# Patient Record
Sex: FEMALE | Race: WHITE | Employment: FULL TIME | URBAN - METROPOLITAN AREA
[De-identification: names, ages, dates, MRNs, and addresses within clinical notes are randomized per-mention and may not be internally consistent; named-entity substitution may affect disease eponyms.]

---

## 2019-01-20 ENCOUNTER — HOSPITAL ENCOUNTER (EMERGENCY)
Age: 54
Discharge: HOME | End: 2019-01-20
Attending: EMERGENCY MEDICINE
Payer: COMMERCIAL

## 2019-01-20 ENCOUNTER — APPOINTMENT (OUTPATIENT)
Dept: GENERAL RADIOLOGY | Age: 54
End: 2019-01-20
Payer: COMMERCIAL

## 2019-01-20 VITALS
HEART RATE: 88 BPM | WEIGHT: 212 LBS | TEMPERATURE: 98.1 F | RESPIRATION RATE: 18 BRPM | DIASTOLIC BLOOD PRESSURE: 67 MMHG | HEIGHT: 67 IN | SYSTOLIC BLOOD PRESSURE: 146 MMHG | BODY MASS INDEX: 33.27 KG/M2 | OXYGEN SATURATION: 98 %

## 2019-01-20 DIAGNOSIS — M54.42 ACUTE MIDLINE LOW BACK PAIN WITH LEFT-SIDED SCIATICA: Primary | ICD-10-CM

## 2019-01-20 LAB
BILIRUBIN URINE: NEGATIVE
BLOOD, URINE: NEGATIVE
CLARITY: CLEAR
COLOR: YELLOW
GLUCOSE URINE: NEGATIVE MG/DL
KETONES, URINE: NEGATIVE MG/DL
LEUKOCYTE ESTERASE, URINE: NEGATIVE
NITRITE, URINE: NEGATIVE
PH UA: 5.5 (ref 5–9)
PROTEIN UA: NEGATIVE MG/DL
SPECIFIC GRAVITY UA: 1.01 (ref 1–1.03)
UROBILINOGEN, URINE: 0.2 E.U./DL

## 2019-01-20 PROCEDURE — 96372 THER/PROPH/DIAG INJ SC/IM: CPT

## 2019-01-20 PROCEDURE — 99283 EMERGENCY DEPT VISIT LOW MDM: CPT

## 2019-01-20 PROCEDURE — 81003 URINALYSIS AUTO W/O SCOPE: CPT

## 2019-01-20 PROCEDURE — 6360000002 HC RX W HCPCS: Performed by: EMERGENCY MEDICINE

## 2019-01-20 PROCEDURE — 72100 X-RAY EXAM L-S SPINE 2/3 VWS: CPT

## 2019-01-20 PROCEDURE — 96374 THER/PROPH/DIAG INJ IV PUSH: CPT

## 2019-01-20 RX ORDER — PREDNISONE 50 MG/1
TABLET ORAL
Qty: 5 TABLET | Refills: 0 | Status: SHIPPED | OUTPATIENT
Start: 2019-01-20 | End: 2019-02-01 | Stop reason: ALTCHOICE

## 2019-01-20 RX ORDER — ORPHENADRINE CITRATE 30 MG/ML
60 INJECTION INTRAMUSCULAR; INTRAVENOUS ONCE
Status: COMPLETED | OUTPATIENT
Start: 2019-01-20 | End: 2019-01-20

## 2019-01-20 RX ORDER — NAPROXEN 250 MG/1
250 TABLET ORAL 2 TIMES DAILY WITH MEALS
Qty: 30 TABLET | Refills: 0 | Status: SHIPPED | OUTPATIENT
Start: 2019-01-20 | End: 2020-05-13 | Stop reason: ALTCHOICE

## 2019-01-20 RX ORDER — KETOROLAC TROMETHAMINE 30 MG/ML
30 INJECTION, SOLUTION INTRAMUSCULAR; INTRAVENOUS ONCE
Status: COMPLETED | OUTPATIENT
Start: 2019-01-20 | End: 2019-01-20

## 2019-01-20 RX ORDER — DEXAMETHASONE SODIUM PHOSPHATE 10 MG/ML
10 INJECTION INTRAMUSCULAR; INTRAVENOUS ONCE
Status: COMPLETED | OUTPATIENT
Start: 2019-01-20 | End: 2019-01-20

## 2019-01-20 RX ORDER — ORPHENADRINE CITRATE 100 MG/1
100 TABLET, EXTENDED RELEASE ORAL 2 TIMES DAILY
Qty: 20 TABLET | Refills: 0 | Status: SHIPPED | OUTPATIENT
Start: 2019-01-20 | End: 2019-01-30

## 2019-01-20 RX ADMIN — KETOROLAC TROMETHAMINE 30 MG: 30 INJECTION, SOLUTION INTRAMUSCULAR; INTRAVENOUS at 15:57

## 2019-01-20 RX ADMIN — ORPHENADRINE CITRATE 60 MG: 60 INJECTION INTRAMUSCULAR; INTRAVENOUS at 15:57

## 2019-01-20 RX ADMIN — DEXAMETHASONE SODIUM PHOSPHATE 10 MG: 10 INJECTION INTRAMUSCULAR; INTRAVENOUS at 15:57

## 2019-01-20 ASSESSMENT — PAIN SCALES - GENERAL
PAINLEVEL_OUTOF10: 8
PAINLEVEL_OUTOF10: 5
PAINLEVEL_OUTOF10: 6

## 2019-01-30 RX ORDER — SODIUM CHLORIDE, SODIUM LACTATE, POTASSIUM CHLORIDE, CALCIUM CHLORIDE 600; 310; 30; 20 MG/100ML; MG/100ML; MG/100ML; MG/100ML
INJECTION, SOLUTION INTRAVENOUS CONTINUOUS
Status: CANCELLED | OUTPATIENT
Start: 2019-01-30

## 2019-01-30 RX ORDER — SODIUM CHLORIDE 0.9 % (FLUSH) 0.9 %
10 SYRINGE (ML) INJECTION EVERY 12 HOURS SCHEDULED
Status: CANCELLED | OUTPATIENT
Start: 2019-01-30

## 2019-01-30 RX ORDER — SODIUM CHLORIDE 0.9 % (FLUSH) 0.9 %
10 SYRINGE (ML) INJECTION PRN
Status: CANCELLED | OUTPATIENT
Start: 2019-01-30

## 2019-02-01 ENCOUNTER — HOSPITAL ENCOUNTER (OUTPATIENT)
Dept: PREADMISSION TESTING | Age: 54
Discharge: HOME | End: 2019-02-01
Payer: COMMERCIAL

## 2019-02-01 VITALS
RESPIRATION RATE: 16 BRPM | HEART RATE: 75 BPM | WEIGHT: 207.19 LBS | TEMPERATURE: 98.1 F | SYSTOLIC BLOOD PRESSURE: 124 MMHG | HEIGHT: 67 IN | BODY MASS INDEX: 32.52 KG/M2 | DIASTOLIC BLOOD PRESSURE: 70 MMHG | OXYGEN SATURATION: 99 %

## 2019-02-01 DIAGNOSIS — Z01.818 PRE-OP TESTING: Primary | ICD-10-CM

## 2019-02-01 LAB
ABO/RH: NORMAL
ALBUMIN SERPL-MCNC: 4.4 G/DL (ref 3.5–5.2)
ALP BLD-CCNC: 75 U/L (ref 35–104)
ALT SERPL-CCNC: 24 U/L (ref 0–32)
ANION GAP SERPL CALCULATED.3IONS-SCNC: 8 MMOL/L (ref 7–16)
ANTIBODY SCREEN: NORMAL
AST SERPL-CCNC: 15 U/L (ref 0–31)
BACTERIA: ABNORMAL /HPF
BILIRUB SERPL-MCNC: 0.3 MG/DL (ref 0–1.2)
BILIRUBIN URINE: NEGATIVE
BLOOD, URINE: NEGATIVE
BUN BLDV-MCNC: 14 MG/DL (ref 6–20)
CALCIUM SERPL-MCNC: 9.3 MG/DL (ref 8.6–10.2)
CHLORIDE BLD-SCNC: 107 MMOL/L (ref 98–107)
CLARITY: ABNORMAL
CO2: 27 MMOL/L (ref 22–29)
COLOR: YELLOW
CREAT SERPL-MCNC: 0.7 MG/DL (ref 0.5–1)
CRYSTALS, UA: ABNORMAL
EKG ATRIAL RATE: 74 BPM
EKG P AXIS: 42 DEGREES
EKG P-R INTERVAL: 156 MS
EKG Q-T INTERVAL: 380 MS
EKG QRS DURATION: 78 MS
EKG QTC CALCULATION (BAZETT): 421 MS
EKG R AXIS: 11 DEGREES
EKG T AXIS: 44 DEGREES
EKG VENTRICULAR RATE: 74 BPM
EPITHELIAL CELLS, UA: ABNORMAL /HPF
GFR AFRICAN AMERICAN: >60
GFR NON-AFRICAN AMERICAN: >60 ML/MIN/1.73
GLUCOSE BLD-MCNC: 134 MG/DL (ref 74–99)
GLUCOSE URINE: NEGATIVE MG/DL
HCT VFR BLD CALC: 40.9 % (ref 34–48)
HEMOGLOBIN: 13.5 G/DL (ref 11.5–15.5)
KETONES, URINE: ABNORMAL MG/DL
LEUKOCYTE ESTERASE, URINE: NEGATIVE
MCH RBC QN AUTO: 29.7 PG (ref 26–35)
MCHC RBC AUTO-ENTMCNC: 33 % (ref 32–34.5)
MCV RBC AUTO: 89.9 FL (ref 80–99.9)
NITRITE, URINE: NEGATIVE
PDW BLD-RTO: 12.4 FL (ref 11.5–15)
PH UA: 5 (ref 5–9)
PLATELET # BLD: 278 E9/L (ref 130–450)
PMV BLD AUTO: 9.3 FL (ref 7–12)
POTASSIUM REFLEX MAGNESIUM: 4.3 MMOL/L (ref 3.5–5)
PROTEIN UA: NEGATIVE MG/DL
RBC # BLD: 4.55 E12/L (ref 3.5–5.5)
RBC UA: ABNORMAL /HPF (ref 0–2)
SODIUM BLD-SCNC: 142 MMOL/L (ref 132–146)
SPECIFIC GRAVITY UA: >=1.03 (ref 1–1.03)
TOTAL PROTEIN: 7 G/DL (ref 6.4–8.3)
UROBILINOGEN, URINE: 0.2 E.U./DL
WBC # BLD: 6.3 E9/L (ref 4.5–11.5)
WBC UA: ABNORMAL /HPF (ref 0–5)

## 2019-02-01 PROCEDURE — 93005 ELECTROCARDIOGRAM TRACING: CPT | Performed by: OBSTETRICS & GYNECOLOGY

## 2019-02-01 PROCEDURE — 80053 COMPREHEN METABOLIC PANEL: CPT

## 2019-02-01 PROCEDURE — 36415 COLL VENOUS BLD VENIPUNCTURE: CPT

## 2019-02-01 PROCEDURE — 85027 COMPLETE CBC AUTOMATED: CPT

## 2019-02-01 PROCEDURE — 86900 BLOOD TYPING SEROLOGIC ABO: CPT

## 2019-02-01 PROCEDURE — 81001 URINALYSIS AUTO W/SCOPE: CPT

## 2019-02-01 PROCEDURE — 86850 RBC ANTIBODY SCREEN: CPT

## 2019-02-01 PROCEDURE — 86901 BLOOD TYPING SEROLOGIC RH(D): CPT

## 2019-02-01 RX ORDER — ORPHENADRINE CITRATE 100 MG/1
100 TABLET, EXTENDED RELEASE ORAL 2 TIMES DAILY
COMMUNITY
End: 2020-05-13 | Stop reason: ALTCHOICE

## 2019-02-01 RX ORDER — ZOLPIDEM TARTRATE 5 MG/1
5 TABLET ORAL NIGHTLY PRN
COMMUNITY
End: 2020-05-13 | Stop reason: ALTCHOICE

## 2019-02-01 RX ORDER — M-VIT,TX,IRON,MINS/CALC/FOLIC 27MG-0.4MG
1 TABLET ORAL DAILY
COMMUNITY

## 2019-02-01 RX ORDER — CALCIUM CARBONATE 500(1250)
500 TABLET ORAL DAILY
COMMUNITY
End: 2019-10-07 | Stop reason: ALTCHOICE

## 2019-02-01 ASSESSMENT — PAIN DESCRIPTION - LOCATION: LOCATION: FOOT

## 2019-02-01 ASSESSMENT — PAIN DESCRIPTION - ORIENTATION: ORIENTATION: RIGHT;LEFT

## 2019-02-01 ASSESSMENT — PAIN SCALES - GENERAL: PAINLEVEL_OUTOF10: 8

## 2019-02-01 ASSESSMENT — PAIN DESCRIPTION - FREQUENCY: FREQUENCY: CONTINUOUS

## 2019-02-01 ASSESSMENT — PAIN DESCRIPTION - DESCRIPTORS: DESCRIPTORS: SHARP;SHOOTING

## 2019-02-01 ASSESSMENT — PAIN DESCRIPTION - PAIN TYPE: TYPE: CHRONIC PAIN

## 2019-02-14 ENCOUNTER — ANESTHESIA (OUTPATIENT)
Dept: OPERATING ROOM | Age: 54
End: 2019-02-14
Payer: COMMERCIAL

## 2019-02-14 ENCOUNTER — HOSPITAL ENCOUNTER (OUTPATIENT)
Age: 54
LOS: 1 days | Discharge: HOME | End: 2019-02-15
Attending: OBSTETRICS & GYNECOLOGY | Admitting: OBSTETRICS & GYNECOLOGY
Payer: COMMERCIAL

## 2019-02-14 ENCOUNTER — ANESTHESIA EVENT (OUTPATIENT)
Dept: OPERATING ROOM | Age: 54
End: 2019-02-14
Payer: COMMERCIAL

## 2019-02-14 VITALS
RESPIRATION RATE: 1 BRPM | TEMPERATURE: 95.7 F | OXYGEN SATURATION: 100 % | SYSTOLIC BLOOD PRESSURE: 103 MMHG | DIASTOLIC BLOOD PRESSURE: 85 MMHG

## 2019-02-14 PROBLEM — N87.9 DYSPLASIA OF CERVIX: Status: ACTIVE | Noted: 2019-02-14

## 2019-02-14 PROCEDURE — 2500000003 HC RX 250 WO HCPCS: Performed by: REGISTERED NURSE

## 2019-02-14 PROCEDURE — 7100000000 HC PACU RECOVERY - FIRST 15 MIN: Performed by: OBSTETRICS & GYNECOLOGY

## 2019-02-14 PROCEDURE — 6360000002 HC RX W HCPCS: Performed by: OBSTETRICS & GYNECOLOGY

## 2019-02-14 PROCEDURE — 6360000002 HC RX W HCPCS: Performed by: REGISTERED NURSE

## 2019-02-14 PROCEDURE — 3600000009 HC SURGERY ROBOT BASE: Performed by: OBSTETRICS & GYNECOLOGY

## 2019-02-14 PROCEDURE — 2580000003 HC RX 258: Performed by: OBSTETRICS & GYNECOLOGY

## 2019-02-14 PROCEDURE — 2720000010 HC SURG SUPPLY STERILE: Performed by: OBSTETRICS & GYNECOLOGY

## 2019-02-14 PROCEDURE — 3700000000 HC ANESTHESIA ATTENDED CARE: Performed by: OBSTETRICS & GYNECOLOGY

## 2019-02-14 PROCEDURE — 3700000001 HC ADD 15 MINUTES (ANESTHESIA): Performed by: OBSTETRICS & GYNECOLOGY

## 2019-02-14 PROCEDURE — 6370000000 HC RX 637 (ALT 250 FOR IP): Performed by: OBSTETRICS & GYNECOLOGY

## 2019-02-14 PROCEDURE — 7100000001 HC PACU RECOVERY - ADDTL 15 MIN: Performed by: OBSTETRICS & GYNECOLOGY

## 2019-02-14 PROCEDURE — S2900 ROBOTIC SURGICAL SYSTEM: HCPCS | Performed by: OBSTETRICS & GYNECOLOGY

## 2019-02-14 PROCEDURE — 3600000019 HC SURGERY ROBOT ADDTL 15MIN: Performed by: OBSTETRICS & GYNECOLOGY

## 2019-02-14 PROCEDURE — 2709999900 HC NON-CHARGEABLE SUPPLY: Performed by: OBSTETRICS & GYNECOLOGY

## 2019-02-14 PROCEDURE — 88307 TISSUE EXAM BY PATHOLOGIST: CPT

## 2019-02-14 RX ORDER — PROPOFOL 10 MG/ML
INJECTION, EMULSION INTRAVENOUS PRN
Status: DISCONTINUED | OUTPATIENT
Start: 2019-02-14 | End: 2019-02-14 | Stop reason: SDUPTHER

## 2019-02-14 RX ORDER — PROMETHAZINE HYDROCHLORIDE 25 MG/ML
INJECTION, SOLUTION INTRAMUSCULAR; INTRAVENOUS PRN
Status: DISCONTINUED | OUTPATIENT
Start: 2019-02-14 | End: 2019-02-14 | Stop reason: SDUPTHER

## 2019-02-14 RX ORDER — NEOSTIGMINE METHYLSULFATE 1 MG/ML
INJECTION, SOLUTION INTRAVENOUS PRN
Status: DISCONTINUED | OUTPATIENT
Start: 2019-02-14 | End: 2019-02-14 | Stop reason: SDUPTHER

## 2019-02-14 RX ORDER — HYDROCODONE BITARTRATE AND ACETAMINOPHEN 5; 325 MG/1; MG/1
2 TABLET ORAL EVERY 6 HOURS PRN
Status: DISCONTINUED | OUTPATIENT
Start: 2019-02-14 | End: 2019-02-15 | Stop reason: HOSPADM

## 2019-02-14 RX ORDER — FENTANYL CITRATE 50 UG/ML
INJECTION, SOLUTION INTRAMUSCULAR; INTRAVENOUS PRN
Status: DISCONTINUED | OUTPATIENT
Start: 2019-02-14 | End: 2019-02-14 | Stop reason: SDUPTHER

## 2019-02-14 RX ORDER — FENTANYL CITRATE 50 UG/ML
50 INJECTION, SOLUTION INTRAMUSCULAR; INTRAVENOUS EVERY 5 MIN PRN
Status: DISCONTINUED | OUTPATIENT
Start: 2019-02-14 | End: 2019-02-14 | Stop reason: HOSPADM

## 2019-02-14 RX ORDER — MORPHINE SULFATE 2 MG/ML
2 INJECTION, SOLUTION INTRAMUSCULAR; INTRAVENOUS
Status: DISCONTINUED | OUTPATIENT
Start: 2019-02-14 | End: 2019-02-15 | Stop reason: HOSPADM

## 2019-02-14 RX ORDER — SODIUM CHLORIDE, SODIUM LACTATE, POTASSIUM CHLORIDE, CALCIUM CHLORIDE 600; 310; 30; 20 MG/100ML; MG/100ML; MG/100ML; MG/100ML
INJECTION, SOLUTION INTRAVENOUS CONTINUOUS
Status: DISCONTINUED | OUTPATIENT
Start: 2019-02-14 | End: 2019-02-15 | Stop reason: HOSPADM

## 2019-02-14 RX ORDER — MEPERIDINE HYDROCHLORIDE 25 MG/ML
12.5 INJECTION INTRAMUSCULAR; INTRAVENOUS; SUBCUTANEOUS EVERY 5 MIN PRN
Status: DISCONTINUED | OUTPATIENT
Start: 2019-02-14 | End: 2019-02-14 | Stop reason: HOSPADM

## 2019-02-14 RX ORDER — MEPERIDINE HYDROCHLORIDE 50 MG/ML
INJECTION INTRAMUSCULAR; INTRAVENOUS; SUBCUTANEOUS PRN
Status: DISCONTINUED | OUTPATIENT
Start: 2019-02-14 | End: 2019-02-14 | Stop reason: SDUPTHER

## 2019-02-14 RX ORDER — HYDROCODONE BITARTRATE AND ACETAMINOPHEN 5; 325 MG/1; MG/1
1 TABLET ORAL EVERY 6 HOURS PRN
Status: DISCONTINUED | OUTPATIENT
Start: 2019-02-14 | End: 2019-02-15 | Stop reason: HOSPADM

## 2019-02-14 RX ORDER — SODIUM CHLORIDE 0.9 % (FLUSH) 0.9 %
10 SYRINGE (ML) INJECTION EVERY 12 HOURS SCHEDULED
Status: DISCONTINUED | OUTPATIENT
Start: 2019-02-14 | End: 2019-02-14 | Stop reason: HOSPADM

## 2019-02-14 RX ORDER — OXYCODONE HYDROCHLORIDE AND ACETAMINOPHEN 5; 325 MG/1; MG/1
1 TABLET ORAL EVERY 4 HOURS PRN
Status: DISCONTINUED | OUTPATIENT
Start: 2019-02-14 | End: 2019-02-14

## 2019-02-14 RX ORDER — FENTANYL CITRATE 50 UG/ML
25 INJECTION, SOLUTION INTRAMUSCULAR; INTRAVENOUS EVERY 5 MIN PRN
Status: DISCONTINUED | OUTPATIENT
Start: 2019-02-14 | End: 2019-02-14 | Stop reason: HOSPADM

## 2019-02-14 RX ORDER — ONDANSETRON 2 MG/ML
4 INJECTION INTRAMUSCULAR; INTRAVENOUS
Status: DISCONTINUED | OUTPATIENT
Start: 2019-02-14 | End: 2019-02-14 | Stop reason: HOSPADM

## 2019-02-14 RX ORDER — SODIUM CHLORIDE 0.9 % (FLUSH) 0.9 %
10 SYRINGE (ML) INJECTION PRN
Status: DISCONTINUED | OUTPATIENT
Start: 2019-02-14 | End: 2019-02-14 | Stop reason: HOSPADM

## 2019-02-14 RX ORDER — GLYCOPYRROLATE 1 MG/5 ML
SYRINGE (ML) INTRAVENOUS PRN
Status: DISCONTINUED | OUTPATIENT
Start: 2019-02-14 | End: 2019-02-14 | Stop reason: SDUPTHER

## 2019-02-14 RX ORDER — DOCUSATE SODIUM 100 MG/1
100 CAPSULE, LIQUID FILLED ORAL 2 TIMES DAILY
Status: DISCONTINUED | OUTPATIENT
Start: 2019-02-14 | End: 2019-02-15 | Stop reason: HOSPADM

## 2019-02-14 RX ORDER — SODIUM CHLORIDE 0.9 % (FLUSH) 0.9 %
10 SYRINGE (ML) INJECTION PRN
Status: DISCONTINUED | OUTPATIENT
Start: 2019-02-14 | End: 2019-02-15 | Stop reason: HOSPADM

## 2019-02-14 RX ORDER — KETOROLAC TROMETHAMINE 30 MG/ML
INJECTION, SOLUTION INTRAMUSCULAR; INTRAVENOUS PRN
Status: DISCONTINUED | OUTPATIENT
Start: 2019-02-14 | End: 2019-02-14 | Stop reason: SDUPTHER

## 2019-02-14 RX ORDER — MIDAZOLAM HYDROCHLORIDE 1 MG/ML
INJECTION INTRAMUSCULAR; INTRAVENOUS PRN
Status: DISCONTINUED | OUTPATIENT
Start: 2019-02-14 | End: 2019-02-14 | Stop reason: SDUPTHER

## 2019-02-14 RX ORDER — DEXAMETHASONE SODIUM PHOSPHATE 10 MG/ML
INJECTION, SOLUTION INTRAMUSCULAR; INTRAVENOUS PRN
Status: DISCONTINUED | OUTPATIENT
Start: 2019-02-14 | End: 2019-02-14 | Stop reason: SDUPTHER

## 2019-02-14 RX ORDER — ROCURONIUM BROMIDE 10 MG/ML
INJECTION, SOLUTION INTRAVENOUS PRN
Status: DISCONTINUED | OUTPATIENT
Start: 2019-02-14 | End: 2019-02-14 | Stop reason: SDUPTHER

## 2019-02-14 RX ORDER — CEFAZOLIN SODIUM 2 G/50ML
2 SOLUTION INTRAVENOUS
Status: COMPLETED | OUTPATIENT
Start: 2019-02-14 | End: 2019-02-14

## 2019-02-14 RX ORDER — SODIUM CHLORIDE 0.9 % (FLUSH) 0.9 %
10 SYRINGE (ML) INJECTION EVERY 12 HOURS SCHEDULED
Status: DISCONTINUED | OUTPATIENT
Start: 2019-02-14 | End: 2019-02-15 | Stop reason: HOSPADM

## 2019-02-14 RX ORDER — OXYCODONE HYDROCHLORIDE AND ACETAMINOPHEN 5; 325 MG/1; MG/1
2 TABLET ORAL EVERY 4 HOURS PRN
Status: DISCONTINUED | OUTPATIENT
Start: 2019-02-14 | End: 2019-02-14

## 2019-02-14 RX ORDER — LIDOCAINE HYDROCHLORIDE 20 MG/ML
INJECTION, SOLUTION EPIDURAL; INFILTRATION; INTRACAUDAL; PERINEURAL PRN
Status: DISCONTINUED | OUTPATIENT
Start: 2019-02-14 | End: 2019-02-14 | Stop reason: SDUPTHER

## 2019-02-14 RX ORDER — IBUPROFEN 800 MG/1
800 TABLET ORAL EVERY 6 HOURS PRN
Status: DISCONTINUED | OUTPATIENT
Start: 2019-02-14 | End: 2019-02-15 | Stop reason: HOSPADM

## 2019-02-14 RX ORDER — ONDANSETRON 2 MG/ML
INJECTION INTRAMUSCULAR; INTRAVENOUS PRN
Status: DISCONTINUED | OUTPATIENT
Start: 2019-02-14 | End: 2019-02-14 | Stop reason: SDUPTHER

## 2019-02-14 RX ORDER — ONDANSETRON 2 MG/ML
4 INJECTION INTRAMUSCULAR; INTRAVENOUS EVERY 8 HOURS PRN
Status: DISCONTINUED | OUTPATIENT
Start: 2019-02-14 | End: 2019-02-15 | Stop reason: HOSPADM

## 2019-02-14 RX ADMIN — FENTANYL CITRATE 50 MCG: 50 INJECTION, SOLUTION INTRAMUSCULAR; INTRAVENOUS at 12:57

## 2019-02-14 RX ADMIN — FENTANYL CITRATE 50 MCG: 50 INJECTION, SOLUTION INTRAMUSCULAR; INTRAVENOUS at 13:13

## 2019-02-14 RX ADMIN — Medication 1 MG: at 12:58

## 2019-02-14 RX ADMIN — Medication 1 MG: at 12:53

## 2019-02-14 RX ADMIN — ROCURONIUM BROMIDE 5 MG: 10 INJECTION, SOLUTION INTRAVENOUS at 11:32

## 2019-02-14 RX ADMIN — HYDROCODONE BITARTRATE AND ACETAMINOPHEN 2 TABLET: 5; 325 TABLET ORAL at 18:33

## 2019-02-14 RX ADMIN — SODIUM CHLORIDE, POTASSIUM CHLORIDE, SODIUM LACTATE AND CALCIUM CHLORIDE: 600; 310; 30; 20 INJECTION, SOLUTION INTRAVENOUS at 16:22

## 2019-02-14 RX ADMIN — DEXAMETHASONE SODIUM PHOSPHATE 10 MG: 10 INJECTION, SOLUTION INTRAMUSCULAR; INTRAVENOUS at 11:00

## 2019-02-14 RX ADMIN — LIDOCAINE HYDROCHLORIDE 40 MG: 20 INJECTION, SOLUTION EPIDURAL; INFILTRATION; INTRACAUDAL; PERINEURAL at 10:54

## 2019-02-14 RX ADMIN — SODIUM CHLORIDE, POTASSIUM CHLORIDE, SODIUM LACTATE AND CALCIUM CHLORIDE: 600; 310; 30; 20 INJECTION, SOLUTION INTRAVENOUS at 09:56

## 2019-02-14 RX ADMIN — PROMETHAZINE HYDROCHLORIDE 12.5 MG: 25 INJECTION INTRAMUSCULAR; INTRAVENOUS at 12:10

## 2019-02-14 RX ADMIN — ONDANSETRON HYDROCHLORIDE 4 MG: 2 INJECTION, SOLUTION INTRAMUSCULAR; INTRAVENOUS at 12:00

## 2019-02-14 RX ADMIN — FENTANYL CITRATE 50 MCG: 50 INJECTION, SOLUTION INTRAMUSCULAR; INTRAVENOUS at 11:10

## 2019-02-14 RX ADMIN — SODIUM CHLORIDE, POTASSIUM CHLORIDE, SODIUM LACTATE AND CALCIUM CHLORIDE: 600; 310; 30; 20 INJECTION, SOLUTION INTRAVENOUS at 12:38

## 2019-02-14 RX ADMIN — Medication 1 MG: at 12:56

## 2019-02-14 RX ADMIN — MEPERIDINE HYDROCHLORIDE 10 MG: 50 INJECTION, SOLUTION INTRAMUSCULAR; INTRAVENOUS; SUBCUTANEOUS at 12:25

## 2019-02-14 RX ADMIN — MEPERIDINE HYDROCHLORIDE 20 MG: 50 INJECTION, SOLUTION INTRAMUSCULAR; INTRAVENOUS; SUBCUTANEOUS at 12:40

## 2019-02-14 RX ADMIN — PROPOFOL 180 MG: 10 INJECTION, EMULSION INTRAVENOUS at 10:54

## 2019-02-14 RX ADMIN — Medication 0.6 MG: at 12:52

## 2019-02-14 RX ADMIN — MORPHINE SULFATE 2 MG: 2 INJECTION, SOLUTION INTRAMUSCULAR; INTRAVENOUS at 15:26

## 2019-02-14 RX ADMIN — MEPERIDINE HYDROCHLORIDE 10 MG: 50 INJECTION, SOLUTION INTRAMUSCULAR; INTRAVENOUS; SUBCUTANEOUS at 12:20

## 2019-02-14 RX ADMIN — FENTANYL CITRATE 150 MCG: 50 INJECTION, SOLUTION INTRAMUSCULAR; INTRAVENOUS at 10:54

## 2019-02-14 RX ADMIN — ROCURONIUM BROMIDE 40 MG: 10 INJECTION, SOLUTION INTRAVENOUS at 10:54

## 2019-02-14 RX ADMIN — FENTANYL CITRATE 50 MCG: 50 INJECTION, SOLUTION INTRAMUSCULAR; INTRAVENOUS at 12:10

## 2019-02-14 RX ADMIN — MIDAZOLAM 2 MG: 1 INJECTION INTRAMUSCULAR; INTRAVENOUS at 10:48

## 2019-02-14 RX ADMIN — IBUPROFEN 800 MG: 800 TABLET, FILM COATED ORAL at 20:41

## 2019-02-14 RX ADMIN — KETOROLAC TROMETHAMINE 30 MG: 30 INJECTION, SOLUTION INTRAMUSCULAR; INTRAVENOUS at 12:25

## 2019-02-14 RX ADMIN — MEPERIDINE HYDROCHLORIDE 10 MG: 50 INJECTION, SOLUTION INTRAMUSCULAR; INTRAVENOUS; SUBCUTANEOUS at 12:30

## 2019-02-14 RX ADMIN — DOCUSATE SODIUM 100 MG: 100 CAPSULE, LIQUID FILLED ORAL at 20:42

## 2019-02-14 RX ADMIN — CEFAZOLIN SODIUM 2 G: 2 SOLUTION INTRAVENOUS at 10:48

## 2019-02-14 RX ADMIN — ROCURONIUM BROMIDE 10 MG: 10 INJECTION, SOLUTION INTRAVENOUS at 11:49

## 2019-02-14 ASSESSMENT — PULMONARY FUNCTION TESTS
PIF_VALUE: 36
PIF_VALUE: 36
PIF_VALUE: 34
PIF_VALUE: 36
PIF_VALUE: 27
PIF_VALUE: 34
PIF_VALUE: 35
PIF_VALUE: 17
PIF_VALUE: 36
PIF_VALUE: 0
PIF_VALUE: 33
PIF_VALUE: 26
PIF_VALUE: 35
PIF_VALUE: 36
PIF_VALUE: 18
PIF_VALUE: 35
PIF_VALUE: 34
PIF_VALUE: 25
PIF_VALUE: 26
PIF_VALUE: 35
PIF_VALUE: 34
PIF_VALUE: 26
PIF_VALUE: 33
PIF_VALUE: 35
PIF_VALUE: 34
PIF_VALUE: 0
PIF_VALUE: 15
PIF_VALUE: 22
PIF_VALUE: 24
PIF_VALUE: 1
PIF_VALUE: 35
PIF_VALUE: 35
PIF_VALUE: 20
PIF_VALUE: 0
PIF_VALUE: 22
PIF_VALUE: 10
PIF_VALUE: 35
PIF_VALUE: 17
PIF_VALUE: 34
PIF_VALUE: 45
PIF_VALUE: 35
PIF_VALUE: 34
PIF_VALUE: 36
PIF_VALUE: 35
PIF_VALUE: 22
PIF_VALUE: 34
PIF_VALUE: 33
PIF_VALUE: 36
PIF_VALUE: 35
PIF_VALUE: 34
PIF_VALUE: 36
PIF_VALUE: 35
PIF_VALUE: 34
PIF_VALUE: 35
PIF_VALUE: 15
PIF_VALUE: 34
PIF_VALUE: 35
PIF_VALUE: 36
PIF_VALUE: 23
PIF_VALUE: 20
PIF_VALUE: 35
PIF_VALUE: 36
PIF_VALUE: 36
PIF_VALUE: 28
PIF_VALUE: 36
PIF_VALUE: 36
PIF_VALUE: 21
PIF_VALUE: 35
PIF_VALUE: 34
PIF_VALUE: 17
PIF_VALUE: 34
PIF_VALUE: 32
PIF_VALUE: 26
PIF_VALUE: 22
PIF_VALUE: 36
PIF_VALUE: 35
PIF_VALUE: 36
PIF_VALUE: 19
PIF_VALUE: 36
PIF_VALUE: 35
PIF_VALUE: 36
PIF_VALUE: 28
PIF_VALUE: 34
PIF_VALUE: 34
PIF_VALUE: 11
PIF_VALUE: 1
PIF_VALUE: 36
PIF_VALUE: 13
PIF_VALUE: 35
PIF_VALUE: 36
PIF_VALUE: 19
PIF_VALUE: 19
PIF_VALUE: 36
PIF_VALUE: 0
PIF_VALUE: 36
PIF_VALUE: 35
PIF_VALUE: 35
PIF_VALUE: 20
PIF_VALUE: 15
PIF_VALUE: 18
PIF_VALUE: 35
PIF_VALUE: 36
PIF_VALUE: 19
PIF_VALUE: 25
PIF_VALUE: 35
PIF_VALUE: 25
PIF_VALUE: 35
PIF_VALUE: 27
PIF_VALUE: 23
PIF_VALUE: 35
PIF_VALUE: 6
PIF_VALUE: 34
PIF_VALUE: 35
PIF_VALUE: 36
PIF_VALUE: 34
PIF_VALUE: 34
PIF_VALUE: 26
PIF_VALUE: 33
PIF_VALUE: 36
PIF_VALUE: 27
PIF_VALUE: 35
PIF_VALUE: 20
PIF_VALUE: 34
PIF_VALUE: 34
PIF_VALUE: 4
PIF_VALUE: 1
PIF_VALUE: 35
PIF_VALUE: 26
PIF_VALUE: 20
PIF_VALUE: 34
PIF_VALUE: 36
PIF_VALUE: 18
PIF_VALUE: 35
PIF_VALUE: 28
PIF_VALUE: 35
PIF_VALUE: 36
PIF_VALUE: 33
PIF_VALUE: 33

## 2019-02-14 ASSESSMENT — PAIN DESCRIPTION - DESCRIPTORS
DESCRIPTORS: ACHING;SORE
DESCRIPTORS: ACHING
DESCRIPTORS: PRESSURE
DESCRIPTORS: PRESSURE;ACHING
DESCRIPTORS: ACHING
DESCRIPTORS: ACHING
DESCRIPTORS: PRESSURE;CRAMPING

## 2019-02-14 ASSESSMENT — PAIN - FUNCTIONAL ASSESSMENT: PAIN_FUNCTIONAL_ASSESSMENT: 0-10

## 2019-02-14 ASSESSMENT — PAIN SCALES - GENERAL
PAINLEVEL_OUTOF10: 0
PAINLEVEL_OUTOF10: 7
PAINLEVEL_OUTOF10: 0
PAINLEVEL_OUTOF10: 3
PAINLEVEL_OUTOF10: 0

## 2019-02-14 ASSESSMENT — ENCOUNTER SYMPTOMS: SHORTNESS OF BREATH: 0

## 2019-02-15 VITALS
HEART RATE: 89 BPM | WEIGHT: 206.5 LBS | SYSTOLIC BLOOD PRESSURE: 114 MMHG | HEIGHT: 67 IN | DIASTOLIC BLOOD PRESSURE: 68 MMHG | RESPIRATION RATE: 18 BRPM | TEMPERATURE: 97.7 F | OXYGEN SATURATION: 97 % | BODY MASS INDEX: 32.41 KG/M2

## 2019-02-15 LAB
HCT VFR BLD CALC: 35.5 % (ref 34–48)
HEMOGLOBIN: 11.8 G/DL (ref 11.5–15.5)

## 2019-02-15 PROCEDURE — 2580000003 HC RX 258: Performed by: OBSTETRICS & GYNECOLOGY

## 2019-02-15 PROCEDURE — 85018 HEMOGLOBIN: CPT

## 2019-02-15 PROCEDURE — 36415 COLL VENOUS BLD VENIPUNCTURE: CPT

## 2019-02-15 PROCEDURE — 6370000000 HC RX 637 (ALT 250 FOR IP): Performed by: OBSTETRICS & GYNECOLOGY

## 2019-02-15 PROCEDURE — 85014 HEMATOCRIT: CPT

## 2019-02-15 RX ADMIN — HYDROCODONE BITARTRATE AND ACETAMINOPHEN 1 TABLET: 5; 325 TABLET ORAL at 12:57

## 2019-02-15 RX ADMIN — SODIUM CHLORIDE, POTASSIUM CHLORIDE, SODIUM LACTATE AND CALCIUM CHLORIDE: 600; 310; 30; 20 INJECTION, SOLUTION INTRAVENOUS at 00:34

## 2019-02-15 RX ADMIN — HYDROCODONE BITARTRATE AND ACETAMINOPHEN 2 TABLET: 5; 325 TABLET ORAL at 00:33

## 2019-02-15 RX ADMIN — DOCUSATE SODIUM 100 MG: 100 CAPSULE, LIQUID FILLED ORAL at 08:13

## 2019-02-15 RX ADMIN — IBUPROFEN 800 MG: 800 TABLET, FILM COATED ORAL at 05:49

## 2019-02-15 RX ADMIN — HYDROCODONE BITARTRATE AND ACETAMINOPHEN 2 TABLET: 5; 325 TABLET ORAL at 06:48

## 2019-02-15 RX ADMIN — Medication 10 ML: at 08:13

## 2019-02-15 ASSESSMENT — PAIN SCALES - GENERAL
PAINLEVEL_OUTOF10: 3
PAINLEVEL_OUTOF10: 7
PAINLEVEL_OUTOF10: 7
PAINLEVEL_OUTOF10: 6
PAINLEVEL_OUTOF10: 6

## 2019-06-15 ENCOUNTER — HOSPITAL ENCOUNTER (EMERGENCY)
Age: 54
Discharge: HOME | End: 2019-06-15
Payer: COMMERCIAL

## 2019-06-15 VITALS
OXYGEN SATURATION: 98 % | RESPIRATION RATE: 20 BRPM | DIASTOLIC BLOOD PRESSURE: 84 MMHG | SYSTOLIC BLOOD PRESSURE: 125 MMHG | HEART RATE: 85 BPM | WEIGHT: 211 LBS | TEMPERATURE: 98 F | BODY MASS INDEX: 33.05 KG/M2

## 2019-06-15 DIAGNOSIS — J20.9 ACUTE BRONCHITIS, UNSPECIFIED ORGANISM: Primary | ICD-10-CM

## 2019-06-15 PROCEDURE — 99212 OFFICE O/P EST SF 10 MIN: CPT

## 2019-06-15 RX ORDER — AZITHROMYCIN 250 MG/1
TABLET, FILM COATED ORAL
Qty: 1 PACKET | Refills: 0 | Status: SHIPPED | OUTPATIENT
Start: 2019-06-15 | End: 2019-06-25

## 2019-06-15 NOTE — ED PROVIDER NOTES
This is a 68-year-old female who presents to urgent care complaining of mostly chest congestion and cough for the past week. Not getting better overall with her medications. Denies abdominal pain nausea vomiting diarrhea or urinary symptoms. Review of Systems   Constitutional:        Pertinent positives and negatives are stated within HPI, all other systems reviewed and are negative. Physical Exam   Constitutional: She is oriented to person, place, and time. She appears well-developed and well-nourished. HENT:   Head: Normocephalic and atraumatic. Right Ear: Hearing, tympanic membrane, external ear and ear canal normal.   Left Ear: Hearing, tympanic membrane, external ear and ear canal normal.   Nose: Nose normal. Right sinus exhibits no maxillary sinus tenderness and no frontal sinus tenderness. Left sinus exhibits no maxillary sinus tenderness and no frontal sinus tenderness. Mouth/Throat: Uvula is midline, oropharynx is clear and moist and mucous membranes are normal.   Eyes: Pupils are equal, round, and reactive to light. Conjunctivae, EOM and lids are normal.   Neck: Normal range of motion. Neck supple. Cardiovascular: Normal rate, regular rhythm and normal heart sounds. No murmur heard. Pulmonary/Chest: Effort normal and breath sounds normal.   Has a frequent harsh cough. Abdominal: Soft. Bowel sounds are normal. There is no tenderness. There is no rigidity, no rebound, no guarding and no CVA tenderness. Musculoskeletal: She exhibits no edema. Neurological: She is alert and oriented to person, place, and time. She has normal strength. No cranial nerve deficit or sensory deficit. Coordination and gait normal. GCS eye subscore is 4. GCS verbal subscore is 5. GCS motor subscore is 6. Skin: Skin is warm and dry. No abrasion and no rash noted. Nursing note and vitals reviewed.       Procedures    [facility]         --------------------------------------------- PAST HISTORY NOTE: This report was transcribed using voice recognition software. Every effort was made to ensure accuracy; however, inadvertent computerized transcription errors may be present.    --------------------------------- IMPRESSION AND DISPOSITION ---------------------------------    IMPRESSION  1.  Acute bronchitis, unspecified organism        DISPOSITION  Disposition: Discharge to home  Patient condition is good            Lenard Jackson PA-C  06/15/19 1910

## 2019-10-07 ENCOUNTER — HOSPITAL ENCOUNTER (EMERGENCY)
Age: 54
Discharge: HOME | End: 2019-10-07
Payer: COMMERCIAL

## 2019-10-07 VITALS
HEIGHT: 67 IN | RESPIRATION RATE: 18 BRPM | WEIGHT: 212 LBS | TEMPERATURE: 98 F | BODY MASS INDEX: 33.27 KG/M2 | DIASTOLIC BLOOD PRESSURE: 85 MMHG | OXYGEN SATURATION: 98 % | HEART RATE: 83 BPM | SYSTOLIC BLOOD PRESSURE: 121 MMHG

## 2019-10-07 DIAGNOSIS — N39.0 URINARY TRACT INFECTION WITHOUT HEMATURIA, SITE UNSPECIFIED: Primary | ICD-10-CM

## 2019-10-07 LAB
BACTERIA: ABNORMAL /HPF
BILIRUBIN URINE: NEGATIVE
BLOOD, URINE: NEGATIVE
CLARITY: NORMAL
COLOR: YELLOW
EPITHELIAL CELLS, UA: ABNORMAL /HPF
GLUCOSE URINE: NEGATIVE MG/DL
KETONES, URINE: NEGATIVE MG/DL
LEUKOCYTE ESTERASE, URINE: NEGATIVE
NITRITE, URINE: NEGATIVE
PH UA: 6.5 (ref 5–9)
PROTEIN UA: NEGATIVE MG/DL
RBC UA: ABNORMAL /HPF (ref 0–2)
SPECIFIC GRAVITY UA: 1.02 (ref 1–1.03)
UROBILINOGEN, URINE: 0.2 E.U./DL
WBC UA: ABNORMAL /HPF (ref 0–5)

## 2019-10-07 PROCEDURE — 87088 URINE BACTERIA CULTURE: CPT

## 2019-10-07 PROCEDURE — 99212 OFFICE O/P EST SF 10 MIN: CPT

## 2019-10-07 PROCEDURE — 81001 URINALYSIS AUTO W/SCOPE: CPT

## 2019-10-07 RX ORDER — CEPHALEXIN 500 MG/1
500 CAPSULE ORAL 3 TIMES DAILY
Qty: 21 CAPSULE | Refills: 0 | Status: SHIPPED | OUTPATIENT
Start: 2019-10-07 | End: 2019-10-14

## 2019-10-07 ASSESSMENT — PAIN SCALES - GENERAL
PAINLEVEL_OUTOF10: 6
PAINLEVEL_OUTOF10: 6

## 2019-10-07 ASSESSMENT — PAIN DESCRIPTION - ORIENTATION
ORIENTATION: LOWER
ORIENTATION: LEFT;RIGHT;MID;LOWER

## 2019-10-07 ASSESSMENT — PAIN DESCRIPTION - LOCATION
LOCATION: BACK
LOCATION: BACK

## 2019-10-07 ASSESSMENT — PAIN DESCRIPTION - PROGRESSION
CLINICAL_PROGRESSION: NOT CHANGED
CLINICAL_PROGRESSION: GRADUALLY WORSENING

## 2019-10-07 ASSESSMENT — PAIN DESCRIPTION - ONSET
ONSET: ON-GOING
ONSET: ON-GOING

## 2019-10-07 ASSESSMENT — PAIN DESCRIPTION - DESCRIPTORS
DESCRIPTORS: CONSTANT
DESCRIPTORS: SHARP;CONSTANT

## 2019-10-07 ASSESSMENT — PAIN - FUNCTIONAL ASSESSMENT: PAIN_FUNCTIONAL_ASSESSMENT: 0-10

## 2019-10-07 ASSESSMENT — PAIN DESCRIPTION - PAIN TYPE
TYPE: ACUTE PAIN
TYPE: ACUTE PAIN

## 2019-10-07 ASSESSMENT — PAIN DESCRIPTION - FREQUENCY: FREQUENCY: CONTINUOUS

## 2019-10-10 LAB — URINE CULTURE, ROUTINE: NORMAL

## 2020-04-19 ENCOUNTER — HOSPITAL ENCOUNTER (EMERGENCY)
Age: 55
Discharge: HOME | End: 2020-04-19
Attending: EMERGENCY MEDICINE
Payer: COMMERCIAL

## 2020-04-19 VITALS
WEIGHT: 208 LBS | HEART RATE: 79 BPM | HEIGHT: 67 IN | DIASTOLIC BLOOD PRESSURE: 86 MMHG | OXYGEN SATURATION: 98 % | RESPIRATION RATE: 16 BRPM | BODY MASS INDEX: 32.65 KG/M2 | TEMPERATURE: 97.5 F | SYSTOLIC BLOOD PRESSURE: 135 MMHG

## 2020-04-19 LAB
BILIRUBIN URINE: NEGATIVE
BLOOD, URINE: NEGATIVE
CLARITY: CLEAR
COLOR: YELLOW
GLUCOSE URINE: NEGATIVE MG/DL
KETONES, URINE: NEGATIVE MG/DL
LEUKOCYTE ESTERASE, URINE: NEGATIVE
NITRITE, URINE: NEGATIVE
PH UA: 5.5 (ref 5–9)
PROTEIN UA: NEGATIVE MG/DL
SPECIFIC GRAVITY UA: 1.02 (ref 1–1.03)
UROBILINOGEN, URINE: 0.2 E.U./DL

## 2020-04-19 PROCEDURE — G0382 LEV 3 HOSP TYPE B ED VISIT: HCPCS

## 2020-04-19 PROCEDURE — 81003 URINALYSIS AUTO W/O SCOPE: CPT

## 2020-04-19 RX ORDER — SULFAMETHOXAZOLE AND TRIMETHOPRIM 800; 160 MG/1; MG/1
1 TABLET ORAL 2 TIMES DAILY
Qty: 14 TABLET | Refills: 0 | Status: SHIPPED | OUTPATIENT
Start: 2020-04-19 | End: 2020-04-26

## 2020-04-19 ASSESSMENT — ENCOUNTER SYMPTOMS
EYE DISCHARGE: 0
ABDOMINAL DISTENTION: 0
SORE THROAT: 0
COUGH: 0
DIARRHEA: 0
SINUS PRESSURE: 0
NAUSEA: 0
BACK PAIN: 0
VOMITING: 0
EYE PAIN: 0
WHEEZING: 0
EYE REDNESS: 0
SHORTNESS OF BREATH: 0

## 2020-04-19 ASSESSMENT — PAIN DESCRIPTION - ORIENTATION: ORIENTATION: RIGHT;LOWER

## 2020-04-19 ASSESSMENT — PAIN DESCRIPTION - PROGRESSION: CLINICAL_PROGRESSION: GRADUALLY WORSENING

## 2020-04-19 ASSESSMENT — PAIN DESCRIPTION - ONSET: ONSET: ON-GOING

## 2020-04-19 ASSESSMENT — PAIN DESCRIPTION - DESCRIPTORS: DESCRIPTORS: CONSTANT;ACHING

## 2020-04-19 ASSESSMENT — PAIN DESCRIPTION - LOCATION: LOCATION: BACK

## 2020-04-19 ASSESSMENT — PAIN DESCRIPTION - PAIN TYPE: TYPE: ACUTE PAIN

## 2020-04-19 NOTE — ED PROVIDER NOTES
Cranial Nerves: No cranial nerve deficit. Coordination: Coordination normal.          Procedures     [facility]          --------------------------------------------- PAST HISTORY ---------------------------------------------  Past Medical History:  has a past medical history of Anesthesia, Gastritis, Headache, and History of muscle pain. Past Surgical History:  has a past surgical history that includes Cholecystectomy; Hysterectomy; tumor removal; other surgical history (Right, 03/07/14); knee surgery (Right); HYSTERECTOMY ABDOMINAL (N/A, 2/14/2019); Foot surgery; and hip surgery (09/09/2019). Social History:  reports that she quit smoking about 5 years ago. Her smoking use included cigarettes. She smoked 0.25 packs per day. She has never used smokeless tobacco. She reports current alcohol use. She reports that she does not use drugs. Family History: family history includes Cancer in her father and mother. The patients home medications have been reviewed.     Allergies: Percocet [oxycodone-acetaminophen]    -------------------------------------------------- RESULTS -------------------------------------------------  Labs:  Results for orders placed or performed during the hospital encounter of 04/19/20   Urinalysis   Result Value Ref Range    Color, UA Yellow Straw/Yellow    Clarity, UA Clear Clear    Glucose, Ur Negative Negative mg/dL    Bilirubin Urine Negative Negative    Ketones, Urine Negative Negative mg/dL    Specific Gravity, UA 1.025 1.005 - 1.030    Blood, Urine Negative Negative    pH, UA 5.5 5.0 - 9.0    Protein, UA Negative Negative mg/dL    Urobilinogen, Urine 0.2 <2.0 E.U./dL    Nitrite, Urine Negative Negative    Leukocyte Esterase, Urine Negative Negative       Radiology:  No orders to display       ------------------------- NURSING NOTES AND VITALS REVIEWED ---------------------------  Date / Time Roomed:  4/19/2020 12:18 PM  ED Bed Assignment:  02/02    The nursing notes within the ED encounter and vital signs as below have been reviewed. /86   Pulse 79   Temp 97.5 °F (36.4 °C) (Temporal)   Resp 16   Ht 5' 7\" (1.702 m)   Wt 208 lb (94.3 kg)   SpO2 98%   BMI 32.58 kg/m²   Oxygen Saturation Interpretation: Normal      ------------------------------------------ PROGRESS NOTES ------------------------------------------  I have spoken with the patient and discussed todays results, in addition to providing specific details for the plan of care and counseling regarding the diagnosis and prognosis. Their questions are answered at this time and they are agreeable with the plan. I discussed at length with them reasons for immediate return here for re evaluation. They will followup with primary care by calling their office tomorrow. --------------------------------- ADDITIONAL PROVIDER NOTES ---------------------------------  At this time the patient is without objective evidence of an acute process requiring hospitalization or inpatient management. They have remained hemodynamically stable throughout their entire ED visit and are stable for discharge with outpatient follow-up. The plan has been discussed in detail and they are aware of the specific conditions for emergent return, as well as the importance of follow-up. New Prescriptions    SULFAMETHOXAZOLE-TRIMETHOPRIM (BACTRIM DS) 800-160 MG PER TABLET    Take 1 tablet by mouth 2 times daily for 7 days       Diagnosis:  1. Dysuria        Disposition:  Patient's disposition: Discharge to home  Patient's condition is stable.                       Ja Cheung MD  04/19/20 1983

## 2020-05-13 ENCOUNTER — HOSPITAL ENCOUNTER (EMERGENCY)
Age: 55
Discharge: HOME | End: 2020-05-13
Attending: NURSE PRACTITIONER
Payer: COMMERCIAL

## 2020-05-13 ENCOUNTER — APPOINTMENT (OUTPATIENT)
Dept: GENERAL RADIOLOGY | Age: 55
End: 2020-05-13
Payer: COMMERCIAL

## 2020-05-13 VITALS
RESPIRATION RATE: 18 BRPM | HEART RATE: 84 BPM | SYSTOLIC BLOOD PRESSURE: 110 MMHG | WEIGHT: 210 LBS | HEIGHT: 67 IN | TEMPERATURE: 97.9 F | DIASTOLIC BLOOD PRESSURE: 82 MMHG | BODY MASS INDEX: 32.96 KG/M2 | OXYGEN SATURATION: 97 %

## 2020-05-13 PROCEDURE — 72050 X-RAY EXAM NECK SPINE 4/5VWS: CPT

## 2020-05-13 PROCEDURE — 6360000002 HC RX W HCPCS: Performed by: NURSE PRACTITIONER

## 2020-05-13 PROCEDURE — 96372 THER/PROPH/DIAG INJ SC/IM: CPT

## 2020-05-13 PROCEDURE — 99212 OFFICE O/P EST SF 10 MIN: CPT

## 2020-05-13 RX ORDER — METHYLPREDNISOLONE 4 MG/1
TABLET ORAL
Qty: 21 TABLET | Refills: 0 | Status: SHIPPED | OUTPATIENT
Start: 2020-05-13 | End: 2020-05-19

## 2020-05-13 RX ORDER — KETOROLAC TROMETHAMINE 30 MG/ML
30 INJECTION, SOLUTION INTRAMUSCULAR; INTRAVENOUS ONCE
Status: COMPLETED | OUTPATIENT
Start: 2020-05-13 | End: 2020-05-13

## 2020-05-13 RX ADMIN — KETOROLAC TROMETHAMINE 30 MG: 30 INJECTION, SOLUTION INTRAMUSCULAR at 12:32

## 2020-05-13 ASSESSMENT — PAIN DESCRIPTION - LOCATION
LOCATION: NECK
LOCATION: NECK

## 2020-05-13 ASSESSMENT — PAIN DESCRIPTION - FREQUENCY
FREQUENCY: CONTINUOUS
FREQUENCY: CONTINUOUS

## 2020-05-13 ASSESSMENT — PAIN DESCRIPTION - ONSET
ONSET: SUDDEN
ONSET: SUDDEN

## 2020-05-13 ASSESSMENT — PAIN DESCRIPTION - PROGRESSION
CLINICAL_PROGRESSION: NOT CHANGED
CLINICAL_PROGRESSION: GRADUALLY IMPROVING

## 2020-05-13 ASSESSMENT — PAIN DESCRIPTION - DESCRIPTORS
DESCRIPTORS: ACHING;SHARP
DESCRIPTORS: ACHING;SHARP

## 2020-05-13 ASSESSMENT — PAIN DESCRIPTION - ORIENTATION
ORIENTATION: LEFT
ORIENTATION: LEFT

## 2020-05-13 ASSESSMENT — PAIN DESCRIPTION - PAIN TYPE
TYPE: ACUTE PAIN
TYPE: ACUTE PAIN

## 2020-05-13 ASSESSMENT — PAIN SCALES - GENERAL
PAINLEVEL_OUTOF10: 5
PAINLEVEL_OUTOF10: 6
PAINLEVEL_OUTOF10: 6

## 2020-05-13 NOTE — ED PROVIDER NOTES
Department of Emergency Medicine   ED  Provider Note  Admit Date/RoomTime: 5/13/2020 11:53 AM  ED Room: 02/02  Chief Complaint   Neck Pain (States she was lifting boxes at work on 04/28/2020 and pulled something in her neck. Having pain on left side of neck that is not getting better.)    History of Present Illness   Source of history provided by:  patient. History/Exam Limitations: none. Marshal Hickey is a 50 y.o. old female who has a past medical history of:   Past Medical History:   Diagnosis Date    Anesthesia     shivers bad when wakes up    Gastritis     Headache     History of muscle pain     presents to the urgent care ambulatory, for aching left sided neck pain which began 2 week(s) prior to arrival.   The pain was caused after lifting boxes at work. Since onset the symptoms are moderate. Her symptoms are aggraveated by flexion and extension and relieved by nothing. ROS    Pertinent positives and negatives are stated within HPI, all other systems reviewed and are negative. Past Surgical History:  has a past surgical history that includes Cholecystectomy; Hysterectomy; tumor removal; other surgical history (Right, 03/07/14); knee surgery (Right); HYSTERECTOMY ABDOMINAL (N/A, 2/14/2019); Foot surgery; and hip surgery (09/09/2019). Social History:  reports that she quit smoking about 5 years ago. Her smoking use included cigarettes. She smoked 0.25 packs per day. She has never used smokeless tobacco. She reports current alcohol use. She reports that she does not use drugs. Family History: family history includes Cancer in her father and mother.    Allergies: Percocet [oxycodone-acetaminophen]    Physical Exam            ED Triage Vitals [05/13/20 1155]   BP Temp Temp Source Pulse Resp SpO2 Height Weight   110/82 97.9 °F (36.6 °C) Oral 84 18 97 % 5' 7\" (1.702 m) 210 lb (95.3 kg)      Oxygen Saturation Interpretation: Normal.    Constitutional:  Alert, development consistent with age. HEENT:  NC/NT. Airway patent. Neck:  In collar: No.          Bony tenderness:  none. Paraspinal tenderness:  none bilateral.        Trapezius Tenderness:  mild to the left. Crepitance: none. ROM: full range with pain. Skin:  no erythema, rash or wounds noted. Chest:  Symmetrical without visible rash or tenderness. Respiratory:  Clear to auscultation and breath sounds equal.  CV:  Regular rate and rhythm, normal heart sounds, without pathological murmurs. Back:  No costovertebral, paravertebral, intervertebral, or vertebral tenderness or spasm. Integument:  No abrasions, ecchymoses, or lacerations unless noted elsewhere. Musculoskeletal:  No extremity tenderness or swelling. Normal, painless range of motion of extremities. No neurovascular deficit. Lymphatics: No lymphangitis or adenopathy noted. Neurological:  Orientation age-appropriate. Motor functions intact. Lab / Imaging Results   (All laboratory and radiology results have been personally reviewed by myself)  Labs:  No results found for this visit on 05/13/20. Imaging: All Radiology results interpreted by Radiologist unless otherwise noted. XR CERVICAL SPINE (4-5 VIEWS)   Final Result   No acute abnormality cervical spine. Multilevel degenerative and other   findings, as above. ED Course / Medical Decision Making     Medications   ketorolac (TORADOL) injection 30 mg (30 mg Intramuscular Given 5/13/20 1232)          Consult(s):   none. Procedure(s):   none. MDM:   Patient is nontoxic appearing in NAD. She reports left neck muscle pain onset 2 weeks ago after lifting boxes at work. Cervical spine x-ray demonstrated no acute abnormality. She will be referred to occupational health for further evaluation and discharged home in stable condition. Counseling:     The emergency provider has spoken with the patient and discussed todays results, in addition to providing

## 2020-09-14 ENCOUNTER — HOSPITAL ENCOUNTER (EMERGENCY)
Age: 55
Discharge: HOME | End: 2020-09-14
Payer: COMMERCIAL

## 2020-09-14 VITALS
RESPIRATION RATE: 18 BRPM | WEIGHT: 218 LBS | HEART RATE: 78 BPM | SYSTOLIC BLOOD PRESSURE: 128 MMHG | TEMPERATURE: 97.8 F | DIASTOLIC BLOOD PRESSURE: 81 MMHG | OXYGEN SATURATION: 99 % | BODY MASS INDEX: 34.14 KG/M2

## 2020-09-14 LAB
BACTERIA: ABNORMAL /HPF
BILIRUBIN URINE: NEGATIVE
BLOOD, URINE: NEGATIVE
CLARITY: NORMAL
COLOR: YELLOW
EPITHELIAL CELLS, UA: ABNORMAL /HPF
GLUCOSE URINE: NEGATIVE MG/DL
KETONES, URINE: NEGATIVE MG/DL
LEUKOCYTE ESTERASE, URINE: NEGATIVE
NITRITE, URINE: NEGATIVE
PH UA: 5 (ref 5–9)
PROTEIN UA: NEGATIVE MG/DL
RBC UA: ABNORMAL /HPF (ref 0–2)
SPECIFIC GRAVITY UA: 1.02 (ref 1–1.03)
UROBILINOGEN, URINE: 0.2 E.U./DL
WBC UA: ABNORMAL /HPF (ref 0–5)

## 2020-09-14 PROCEDURE — 81001 URINALYSIS AUTO W/SCOPE: CPT

## 2020-09-14 PROCEDURE — 87088 URINE BACTERIA CULTURE: CPT

## 2020-09-14 PROCEDURE — 99212 OFFICE O/P EST SF 10 MIN: CPT

## 2020-09-14 RX ORDER — CEPHALEXIN 500 MG/1
500 CAPSULE ORAL 3 TIMES DAILY
Qty: 21 CAPSULE | Refills: 0 | Status: SHIPPED | OUTPATIENT
Start: 2020-09-14 | End: 2020-09-21

## 2020-09-14 RX ORDER — PHENAZOPYRIDINE HYDROCHLORIDE 200 MG/1
200 TABLET, FILM COATED ORAL 3 TIMES DAILY PRN
Qty: 6 TABLET | Refills: 0 | Status: SHIPPED | OUTPATIENT
Start: 2020-09-14 | End: 2020-09-16

## 2020-09-14 ASSESSMENT — PAIN DESCRIPTION - PAIN TYPE: TYPE: ACUTE PAIN

## 2020-09-14 ASSESSMENT — PAIN DESCRIPTION - LOCATION: LOCATION: BACK

## 2020-09-14 ASSESSMENT — PAIN DESCRIPTION - DESCRIPTORS: DESCRIPTORS: PRESSURE;SHARP;ACHING

## 2020-09-14 ASSESSMENT — PAIN DESCRIPTION - ORIENTATION: ORIENTATION: RIGHT

## 2020-09-14 ASSESSMENT — PAIN SCALES - GENERAL: PAINLEVEL_OUTOF10: 4

## 2020-09-14 NOTE — ED PROVIDER NOTES
This is a 61-year-old female that presents to urgent care complaining of urinary symptoms for the past 4 days. She complains of lower back pain along with urinary frequency and bladder pressure along with foul smelling urine. No fever or chills. States history of UTI. No chest pain or shortness of breath. On first contact with patient she is in no acute distress. Review of Systems   Constitutional:        Pertinent positives and negatives are stated within HPI, all other systems reviewed and are negative. Physical Exam  Vitals signs and nursing note reviewed. Constitutional:       Appearance: She is well-developed. HENT:      Head: Normocephalic and atraumatic. Right Ear: Hearing and external ear normal.      Left Ear: Hearing and external ear normal.      Nose: Nose normal.      Mouth/Throat:      Pharynx: Uvula midline. Eyes:      General: Lids are normal.      Conjunctiva/sclera: Conjunctivae normal.      Pupils: Pupils are equal, round, and reactive to light. Neck:      Musculoskeletal: Normal range of motion and neck supple. Cardiovascular:      Rate and Rhythm: Normal rate and regular rhythm. Heart sounds: Normal heart sounds. No murmur. Pulmonary:      Effort: Pulmonary effort is normal.      Breath sounds: Normal breath sounds. Abdominal:      General: Bowel sounds are normal.      Palpations: Abdomen is soft. Abdomen is not rigid. Tenderness: There is no abdominal tenderness. There is no right CVA tenderness, left CVA tenderness, guarding or rebound. Skin:     General: Skin is warm and dry. Findings: No abrasion or rash. Neurological:      Mental Status: She is alert and oriented to person, place, and time. GCS: GCS eye subscore is 4. GCS verbal subscore is 5. GCS motor subscore is 6. Cranial Nerves: No cranial nerve deficit. Sensory: No sensory deficit.       Coordination: Coordination normal.      Gait: Gait normal. the ED encounter and vital signs as below have been reviewed. /81   Pulse 78   Temp 97.8 °F (36.6 °C) (Infrared)   Resp 18   Wt 218 lb (98.9 kg)   SpO2 99%   BMI 34.14 kg/m²   Oxygen Saturation Interpretation: Normal      ------------------------------------------ PROGRESS NOTES ------------------------------------------   I have spoken with the patient and discussed todays results, in addition to providing specific details for the plan of care and counseling regarding the diagnosis and prognosis. Their questions are answered at this time and they are agreeable with the plan.      --------------------------------- ADDITIONAL PROVIDER NOTES ---------------------------------     This patient is stable for discharge. I have shared the specific conditions for return, as well as the importance of follow-up. * NOTE: This report was transcribed using voice recognition software. Every effort was made to ensure accuracy; however, inadvertent computerized transcription errors may be present.    --------------------------------- IMPRESSION AND DISPOSITION ---------------------------------    IMPRESSION  1.  Acute cystitis without hematuria        DISPOSITION  Disposition: Discharge to home  Patient condition is good         Louis Talley PA-C  09/14/20 [id number]

## 2020-09-16 LAB — URINE CULTURE, ROUTINE: NORMAL

## 2020-10-21 ENCOUNTER — TELEPHONE (OUTPATIENT)
Dept: PHYSICAL MEDICINE AND REHAB | Age: 55
End: 2020-10-21

## 2020-10-21 NOTE — TELEPHONE ENCOUNTER
Called and left message on patient voicemail that I was calling to schedule an appointment for workers comp neck pain. Will wait for return call from patient.

## 2020-10-27 ENCOUNTER — OFFICE VISIT (OUTPATIENT)
Dept: PHYSICAL MEDICINE AND REHAB | Age: 55
End: 2020-10-27
Payer: COMMERCIAL

## 2020-10-27 VITALS
DIASTOLIC BLOOD PRESSURE: 74 MMHG | HEART RATE: 81 BPM | BODY MASS INDEX: 35.47 KG/M2 | HEIGHT: 67 IN | WEIGHT: 226 LBS | SYSTOLIC BLOOD PRESSURE: 122 MMHG

## 2020-10-27 PROCEDURE — 99244 OFF/OP CNSLTJ NEW/EST MOD 40: CPT | Performed by: PHYSICAL MEDICINE & REHABILITATION

## 2020-10-27 RX ORDER — PREDNISONE 20 MG/1
60 TABLET ORAL DAILY
Qty: 30 TABLET | Refills: 0 | Status: SHIPPED | OUTPATIENT
Start: 2020-10-27 | End: 2020-11-06

## 2020-10-27 RX ORDER — TIZANIDINE 4 MG/1
4 TABLET ORAL 3 TIMES DAILY
Qty: 90 TABLET | Refills: 1 | Status: SHIPPED
Start: 2020-10-27 | End: 2020-12-18

## 2020-10-27 RX ORDER — LIDOCAINE 50 MG/G
PATCH TOPICAL
COMMUNITY
Start: 2020-09-13

## 2020-10-27 NOTE — PROGRESS NOTES
Frank Monroy D.O., P.T. [facility] Physical Medicine and Rehabilitation  [address], [address], [place]  Phone: [phone]  Fax: [phone]    PCP: No primary care provider on file. Date of visit: 10/27/20    Chief Complaint   Patient presents with    Neck Pain     left sided new patient workers comp       Dear Dr. Caitlin Henry,    As you know,  José Antonio Vance is a 50 y.o.  right hand dominant woman with sudden onset of left neck pain after a lifting injury in April 2020. She was working at [employer] as a  at the time of her injury. Now, the pain is constant and occurs daily. The pain is rated Pain Score:   5, is described as dull aching, and is located in the left side of the neck with radiation to the left biceps. The symptoms have been unchanged since onset. The pain is better with nothing in particular. The pain is worse with rest. The prior workup has included: Xray showed degenerative changes. The prior treatment has included: physical therapy, Flexeril, ibuprofen, Tylenol, lidocaine, heat. She is working at [employer].       Past Medical History:   Diagnosis Date    Anesthesia     shivers bad when wakes up    Gastritis     Headache     History of muscle pain     Osteoarthritis     Osteoporosis 2018     Past Surgical History:   Procedure Laterality Date    CHOLECYSTECTOMY      FOOT SURGERY      HIP SURGERY  09/09/2019    Left Hip Replacement    HYSTERECTOMY      HYSTERECTOMY ABDOMINAL N/A 2/14/2019    LAPAROSCOPY ROBOT XI ASSISTED REMOVAL OF CERVICAL STUMP WITH BSO CYSTOSCOPY performed by Aj Shea MD [facility] Right     torn meniscus    OTHER SURGICAL HISTORY Right 03/07/14    second metatarsal cuneformjoint fusion with bone marrow aspirate    TUMOR REMOVAL      back     Social History     Tobacco Use    Smoking status: Former Smoker     Packs/day: 0.25     Types: Cigarettes     Last attempt to quit: 10/11/2014 Years since quittin.0    Smokeless tobacco: Never Used   Substance Use Topics    Alcohol use: Yes     Comment: occas    Drug use: No     Family History   Problem Relation Age of Onset    Cancer Mother     Cancer Father      Allergies   Allergen Reactions    Oxycodone Swelling    Percocet [Oxycodone-Acetaminophen] Swelling       Current Outpatient Medications   Medication Sig Dispense Refill    diclofenac sodium (VOLTAREN) 1 % GEL diclofenac 1 % topical gel      lidocaine (LIDODERM) 5 % APPLY 1 PATCH DAILY FOR AT LEAST 12 HOURS TO PAINFUL AREA      predniSONE (DELTASONE) 20 MG tablet Take 3 tablets by mouth daily for 10 days 30 tablet 0    tiZANidine (ZANAFLEX) 4 MG tablet Take 1 tablet by mouth 3 times daily 90 tablet 1    medical marijuana Take by mouth as needed.  Probiotic Product (PROBIOTIC PO) Take by mouth      Multiple Vitamins-Minerals (THERAPEUTIC MULTIVITAMIN-MINERALS) tablet Take 1 tablet by mouth daily      aspirin 81 MG tablet Take 81 mg by mouth daily      ibuprofen (ADVIL;MOTRIN) 800 MG tablet Take 1 tablet by mouth every 8 hours as needed for Pain 30 tablet 0     No current facility-administered medications for this visit. Review of Systems - For review of systems, positive symptoms are underlined and negative findings are not underlined. General: chills, fatigue, fever, malaise, night sweats, weight gain,  weight loss. Psychological: anxiety, depression, suicidal ideation, sleep disturbances, behavioral disorder, difficulty concentrating, disorientation, hallucinations, mood swings, obsessive thoughts, physical abuse,  sexual abuse. Ophthalmic: blurry vision, decreased vision, double vision, loss of vision, photophobia, use of corrective device. Ear Nose Throat: hearing loss, tinnitus, phonophobia, sensitivity to smells, vertigo, or vocal changes. Allergy/Immunology: seasonal allergies, watery eyes, itchy eyes, frequent infections.   Hematological and Lymphatic: bleeding problems, blood clots, bruising,  yellowing of the skin, swollen lymph nodes. Endocrine:  polydypsia, polyuria, temperature intolerance. Respiratory: cough, shortness of breath, wheezing. Cardiovascular: syncope, chest pain, dyspnea on exertion, edema, irregular heartbeat,  palpitations. Gastrointestinal: abdominal pain, constipation, diarrhea,  decreased appetite, heartburn, hematemesis, melena, nausea, vomiting, stool incontinence, abnormal swallowing. Genito-Urinary: dysuria, hematuria, incontinence, frequency, urgency. Musculoskeletal: joint pain, stiffness, swelling, muscle pain, muscle  tenderness. Neurological: confusion, memory loss, dizziness, gait disturbance, headaches, impaired coordination, decreased balance, numbness/tingling, seizures, speech problems, tremors,weakness. Dermatological:  hair changes, nail changes, pruritus, rash. Physical Exam: Blood pressure 122/74, pulse 81, height 5' 7\" (1.702 m), weight 226 lb (102.5 kg). General: The patient is in no apparent distress. Body habitus is obese. HEENT: No rhinorrhea, sneezing, yawning, or lacrimation. No scleral icterus or conjunctival injection. SKIN: No piloerection. No track marks. No rash. Normal turgor. No erythema or ecchymosis. Psychological: Mood and affect are appropriate. Hygiene is appropriate. Cardiovascular:  Heart is regular rate and rhythm. Peripheral pulses are 2+ at the dorsalis pedis, posterior tibial and radial arteries. There is no edema. Respiratory: Respirations are regular and unlabored. There is no cyanosis. Lymphatic: There is no cervical or inguinal lymphadenopathy. Gastrointestinal: Soft abdomen, non-tender. No pulsating abdominal mass. Genitourinary: No costovertebral angle tenderness. MSK: Cervical: Cervical lordosis increased,  thoracic kyphosis normal and lumbar lordosis normal. No superficial or bony tenderness.   + tenderness to palpation at bilateral cervical paraspinals, bilateral upper trapezius, sternocleidomastoid,  medial scapular muscles,  Scalenes with spasm left greater than right. No tenderness of occipital notch,No trigger points. No edema, erythema, ecchymosis, effusion, instability, mass or deformity. No midline bony tenderness. Spurling is positive left. Cervical AROM in flexion is 60 degrees, in extension is 20 degrees, in left rotation is 60degrees, in right rotation is 70  degrees, in left lateral flexion is 40 degrees and in right lateral flexion is 30 degrees. There is no crepitus. bilateral Shoulder: No edema, erythema, ecchymosis, effusion, instability, mass or deformity. Full painless ROM bilateral shoulders. No painful arc. No crepitus. No tenderness to palpation at the acromioclavicular joint, subacromial space or biceps tendon insertion. Negative Randy-Wall, Scarf,  Drop arm, and Speeds. Neurologic: Awake, alert and oriented in three planes. Speech is fluent. No facial weakness. Tongue is midline. Hearing is intact for conversation. Pupils are equal and round. Extraocular muscles are intact. Hearing is intact for conversation. Shoulder shrug symmetric. Sensation: Intact for light touch and pin prick in all upper and lower extremity dermatomes. Vibration and proprioception are intact at the bilateral first MTP. Strength: 5/5 in all myotomes in the upper and lower extremities. Muscle Tendon Reflexes: 2+ symmetric in the bilateral upper and lower extremities. Babinski is downgoing bilaterally. Burnard Flavin is negative bilaterally. Gait is Normal.   Romberg is negative. Heel and toe walk are normal.  Tandem walk is normal.  No clonus or spasticity. The patient was able to rise from a chair and squat without difficulty. There is no tremor. Impression:   1. Chronic neck pain    2. Cervical radiculitis    3. Neck sprain, sequela    4.  Spasm of muscle        Plan:   Orders Placed This Encounter   Procedures    MRI CERVICAL SPINE WO CONTRAST     Standing Status: Future     Standing Expiration Date:   10/27/2021    External Referral To Acupuncture     Referral Priority:   Routine     Referral Type:   Eval and Treat     Referral Reason:   Specialty Services Required     Requested Specialty:   Acupuncturist     Number of Visits Requested:   1    Massage therapy     Deep tissue massage cervical    EMG     Order Specific Question:   Which body part? Answer:   bilateral upper extremiteis regarding left c6 radiculopathy     Orders Placed This Encounter   Medications    predniSONE (DELTASONE) 20 MG tablet     Sig: Take 3 tablets by mouth daily for 10 days     Dispense:  30 tablet     Refill:  0    tiZANidine (ZANAFLEX) 4 MG tablet     Sig: Take 1 tablet by mouth 3 times daily     Dispense:  90 tablet     Refill:  1        The patient was educated about the diagnosis, prognosis, indications, risks and benefits of treatment. An opportunity to ask questions was given to the patient and questions were answered. The patient agreed to proceed with the recommended treatment as described above.  Follow up for MRI results     Thank you for the consultation and for allowing me to participate in the care of this patient. Sincerely,         Prakash Vazquez D.O., P.T.   Board Certified Physical Medicine and Rehabilitation  Board Certified Electrodiagnostic Medicine

## 2020-11-11 ENCOUNTER — HOSPITAL ENCOUNTER (OUTPATIENT)
Dept: MRI IMAGING | Age: 55
Discharge: HOME | End: 2020-11-13
Payer: COMMERCIAL

## 2020-11-11 PROCEDURE — 72141 MRI NECK SPINE W/O DYE: CPT

## 2020-11-12 ENCOUNTER — TELEPHONE (OUTPATIENT)
Dept: PHYSICAL MEDICINE AND REHAB | Age: 55
End: 2020-11-12

## 2020-11-12 NOTE — TELEPHONE ENCOUNTER
Called and left message on patient voicemail that I was calling to schedule an appointment to go over MRI results. Will wait for return call from patient.

## 2020-11-12 NOTE — TELEPHONE ENCOUNTER
Pt called in to schedule appt. She said she is only available between 11-1. I have her scheduled at the next available appt on 11/23. If that needs to be moved up please reach out to pt.  Thanks

## 2020-11-19 ENCOUNTER — HOSPITAL ENCOUNTER (OUTPATIENT)
Dept: NEUROLOGY | Age: 55
Discharge: HOME | End: 2020-11-19
Payer: COMMERCIAL

## 2020-11-19 VITALS — HEIGHT: 67 IN | WEIGHT: 226 LBS | BODY MASS INDEX: 35.47 KG/M2

## 2020-11-19 PROCEDURE — 95911 NRV CNDJ TEST 9-10 STUDIES: CPT | Performed by: PHYSICAL MEDICINE & REHABILITATION

## 2020-11-19 PROCEDURE — 95886 MUSC TEST DONE W/N TEST COMP: CPT

## 2020-11-19 PROCEDURE — 95910 NRV CNDJ TEST 7-8 STUDIES: CPT

## 2020-11-19 PROCEDURE — 95886 MUSC TEST DONE W/N TEST COMP: CPT | Performed by: PHYSICAL MEDICINE & REHABILITATION

## 2020-11-19 NOTE — PROCEDURES
[facility]  [address] [place] [address], [address] [place] [address]  Phone: [phone]  Fax: [phone]      Referring Provider: Isabella Gibbs DO  Primary Care Physician: Milind Harrell MD  Patient Name: Robbie Reid  Patient YOB: 1965  Gender: female  BMI: Body mass index is 35.4 kg/m². Height 5' 7\" (1.702 m), weight 226 lb (102.5 kg). 11/19/2020    Description of clinical problem:   Neck pain radiating to left upper extremity    Pain: Yes  ; Numbness/tingling: left hand numbness/tingling at symptom onset, now resolved; Weakness: Yes       Brief physical exam:   Sensory deficit: No; Weakness: No; Atrophy: No; Reflex abnormality: No      Study Limitations:  Difficulty relaxing cervical paraspinals    Motor NCS      Nerve / Sites Lat. Lat Diff Amplitude Amp. 1-2 Distance Velocity Temp.    ms ms mV % cm m/s °C   L Median - APB      Wrist 3.59  11.3 100 8  33.5      Elbow 7.45 3.85 11.2 99 21 54 33.5   L Ulnar - ADM      Wrist 2.50  12.5 100 8  33      B. Elbow 6.04 3.54 11.3 89.7 19 54 32.9      A. Elbow 7.86 1.82 11.2 89.5 10 55 32.9   L Ulnar - FDI      Wrist 2.97  11.3 100   32.6      B. Elbow 6.30 3.33 10.8 95.5 19 57 32.5      A. Elbow 8.18 1.87 10.1 89.2 10 53 32.5       Sensory NCS      Nerve / Sites Onset Lat Peak Lat PP Amp Distance Velocity Temp.    ms ms µV cm m/s °C   L Median - Digit II (Antidromic)      Mid Palm 1.25 2.03 34.3 7 56 32.6      Wrist 3.18 3.80 27.4 14 44 32.6   L Ulnar - Digit V (Antidromic)      Wrist 2.81 3.65 20.3 14 50 32.7   L Radial - Anatomical snuff box (Forearm)      Forearm 1.77 2.66 18.0 10 56 33.1   L Lateral antebrachial cutaneous - Forearm (Elbow)      Elbow 1.88 2.19 8.4 10 53 32.5   L Medial antebrachial cutaneous - Forearm (Elbow)      Elbow 1.46 2.14 8.5 10 69 33       F  Wave      Nerve F Lat M Lat F-M Lat    ms ms ms   L Median - APB 26.9 3.5 23.4   L Ulnar - ADM 26.2 2.1 24.1       EMG         EMG Summary Table Spontaneous MUAP Recruitment   Muscle IA Fib PSW Fasc H.F. Amp Dur. PPP Pattern   L. Deltoid N None None None None N N N N   L. Biceps brachii N None None None None N N 1+ N   L. Triceps brachii N None None None None N N N N   L. Pronator teres N None None None None N N 1+ N   L. Brachioradialis N None None None None N N 1+ N   L. First dorsal interosseous N None None None None N N N N   L. Abductor pollicis brevis N None None None None N N N N   L. Cervical paraspinals (mid) N None None None None    Unable To Relax   L. Cervical paraspinals (low) N None None None None    Unable To Relax             Summary of Findings:   Nerve conduction studies:   Sensory nerve conduction studies of the left median, ulnar, radial, lateral antebrachial cutaneous, and medial antebrachial cutaneous nerves showed normal distal latencies and normal SNAP amplitudes. Motor nerve conduction studies of the left median and ulnar nerves showed normal distal latencies, normal CMAP amplitudes, and normal conduction velocities. F-wave studies of the left median and ulnar nerves revealed normal latencies. Needle EMG:   · Needle EMG was performed using a monopolar needle. · The following abnormalities were seen on needle EMG: polyphasic MUAPs were noted in the left biceps, pronator teres, and brachioradialis. All other muscles tested, as listed in the table above, demonstrated normal amplitude, duration, phases, and recruitment. There was no evidence of active denervation. There was no obvious abnormal spontaneous activity in the left cervical paraspinals, though paraspinal evaluation was limited due to patient difficulty relaxing paraspinal muscles. Diagnostic Interpretation: This study was Abnormal.     1. There is electrodiagnostic evidence consistent with mild chronic left C6 motor radiculopathy, without evidence of active denervation.      2. There is no electrodiagnostic evidence of any other peripheral nerve

## 2020-11-23 ENCOUNTER — OFFICE VISIT (OUTPATIENT)
Dept: PHYSICAL MEDICINE AND REHAB | Age: 55
End: 2020-11-23
Payer: COMMERCIAL

## 2020-11-23 VITALS
SYSTOLIC BLOOD PRESSURE: 141 MMHG | WEIGHT: 220 LBS | BODY MASS INDEX: 34.53 KG/M2 | HEIGHT: 67 IN | DIASTOLIC BLOOD PRESSURE: 84 MMHG | HEART RATE: 82 BPM

## 2020-11-23 PROCEDURE — 99214 OFFICE O/P EST MOD 30 MIN: CPT | Performed by: PHYSICAL MEDICINE & REHABILITATION

## 2020-11-23 NOTE — PROGRESS NOTES
Ulis Burkitt, D.O. [facility]  [address]  Phone: [phone]  Fax: [phone]        11/23/20    Chief Complaint   Patient presents with    Neck Pain     follow up and results MRI        HPI:  Myah Ortiz is a 50y.o. year old woman seen today in follow up regarding neck pain. Interval history: Since the last visit the patient had MRI which I reviewed with the patient showed facet arthritis, bulging disc at C3-4, C4-5, C5-6 with stenosis. EMG showed  L C6 radiculopathy. Zanaflex caused sedation during waking hours. Today, the pain is rated Pain Score:   4 where 0 is no pain and 10 is pain as bad as it can be. The pain is located in the neck,  radiates distally to the left shoulder, and is described as aching, swollen, tight. This pain occurs all day. The symptoms have been unchanged since onset. Symptoms are exacerbated by turning head. Factors which relieve the pain include rest. Other associated symptoms include swelling, paresthesias. Otherwise, the pain assessment has not changed since the last visit.      Past Medical History:   Diagnosis Date    Anesthesia     shivers bad when wakes up    Gastritis     Headache     History of muscle pain     Osteoarthritis     Osteoporosis 2018       Past Surgical History:   Procedure Laterality Date    CHOLECYSTECTOMY      FOOT SURGERY      HIP SURGERY  09/09/2019    Left Hip Replacement    HYSTERECTOMY      HYSTERECTOMY ABDOMINAL N/A 2/14/2019    LAPAROSCOPY ROBOT XI ASSISTED REMOVAL OF CERVICAL STUMP WITH BSO CYSTOSCOPY performed by Emerald Zhang MD at [facility] Right     torn meniscus    OTHER SURGICAL HISTORY Right 03/07/14    second metatarsal cuneformjoint fusion with bone marrow aspirate    TUMOR REMOVAL      back       Social History     Tobacco Use    Smoking status: Former Smoker     Packs/day: 0.25     Types: Cigarettes     Last attempt to quit: 10/11/2014     Years since quittin.1    Smokeless tobacco: Never Used   Substance Use Topics    Alcohol use: Yes     Comment: occas    Drug use: No       Family History   Problem Relation Age of Onset    Cancer Mother     Cancer Father        Current Outpatient Medications   Medication Sig Dispense Refill    diclofenac sodium (VOLTAREN) 1 % GEL diclofenac 1 % topical gel      lidocaine (LIDODERM) 5 % APPLY 1 PATCH DAILY FOR AT LEAST 12 HOURS TO PAINFUL AREA      tiZANidine (ZANAFLEX) 4 MG tablet Take 1 tablet by mouth 3 times daily 90 tablet 1    medical marijuana Take by mouth as needed.  Probiotic Product (PROBIOTIC PO) Take by mouth      Multiple Vitamins-Minerals (THERAPEUTIC MULTIVITAMIN-MINERALS) tablet Take 1 tablet by mouth daily      aspirin 81 MG tablet Take 81 mg by mouth daily      ibuprofen (ADVIL;MOTRIN) 800 MG tablet Take 1 tablet by mouth every 8 hours as needed for Pain 30 tablet 0     No current facility-administered medications for this visit. Allergies   Allergen Reactions    Oxycodone Swelling    Percocet [Oxycodone-Acetaminophen] Swelling       Review of Systems:  No new weakness, paresthesia, incontinence of bowel or bladder, saddle anesthesia, falls or gait dysfunction. Otherwise, per HPI. Physical Exam:   Blood pressure (!) 141/84, pulse 82, height 5' 7\" (1.702 m), weight 220 lb (99.8 kg). GENERAL: The patient is in no apparent distress. Body habitus is obese. HEENT: No rhinorrhea, sneezing, yawning, or lacrimation. No scleral icterus or conjunctival injection. SKIN: No piloerection. No tract marks. No rash. PSYCH: Mood and affect are appropriate. Hygiene is appropriate. CARDIOVASCULAR  Heart is regular rate and rhythm. There is no edema. RESPIRATORY: Respirations are regular and unlabored. There is no cyanosis. GASTROINTESTINAL: Soft abdomen, non-tender.   MSK: Cervical: Cervical lordosis increased,  thoracic kyphosis normal and lumbar lordosis normal. No superficial or bony tenderness. + tenderness to palpation at bilateral cervical paraspinals, bilateral upper trapezius,   sternocleidomastoid,  medial scapular muscles,  Scalenes with spasm left greater than right. No tenderness of occipital notch,No trigger points. No edema, erythema, ecchymosis, effusion, instability, mass or deformity. No midline bony tenderness. Spurling is positive left. Cervical AROM in flexion is 60 degrees, in extension is 20 degrees, in left rotation is 60degrees, in right rotation is 70  degrees, in left lateral flexion is 40 degrees and in right lateral flexion is 30 degrees. There is no crepitus. bilateral Shoulder: No edema, erythema, ecchymosis, effusion, instability, mass or deformity. Full painless ROM bilateral shoulders. No painful arc. No crepitus. No tenderness to palpation at the acromioclavicular joint, subacromial space or biceps tendon insertion. Negative Randy-Wall, Scarf,  Drop arm, and Speeds. Neurologic: Awake, alert and oriented in three planes. Speech is fluent. No facial weakness. Tongue is midline. Hearing is intact for conversation. Pupils are equal and round. Extraocular muscles are intact. Hearing is intact for conversation. Shoulder shrug symmetric. Sensation: Intact for light touch and pin prick in all upper and lower extremity dermatomes. Vibration and proprioception are intact at the bilateral first MTP. Strength: 5/5 in all myotomes in the upper and lower extremities. Muscle Tendon Reflexes: 2+ symmetric in the bilateral upper and lower extremities. Babinski is downgoing bilaterally. Chick Rain is negative bilaterally. Gait is Normal.   Romberg is negative. Heel and toe walk are normal.  Tandem walk is normal.  No clonus or spasticity. The patient was able to rise from a chair and squat without difficulty. There is no tremor. Impression:   1. Chronic neck pain    2. Spondylosis, cervical    3.  Cervical stenosis of spinal canal    4. Cervical radiculopathy        Plan:  Start acupuncture and massage therapy as previously planned. Continue home exercise program.   Ok to decrease Zanaflex to qhs only due to sedation. The patient was educated about the diagnosis, prognosis, indications, risks and benefits of treatment. An opportunity to ask questions was given to the patient and questions were answered. The patient agreed to proceed with the recommended treatment as described above. Return in about 1 month (around 12/23/2020). Thank you for allowing me to participate in the care of your patient. Gus Gilbert D.O., P.T.   Board Certified Physical Medicine and Rehabilitation  Board Certified Electrodiagnostic Medicine

## 2020-12-18 RX ORDER — TIZANIDINE 4 MG/1
TABLET ORAL
Qty: 72 TABLET | Refills: 0 | Status: SHIPPED | OUTPATIENT
Start: 2020-12-18 | End: 2021-01-11

## 2020-12-18 NOTE — TELEPHONE ENCOUNTER
Patient last visit 11-23-20 next visit 1-. Pharmacy requesting refill on Tizanidine 4 mg 1 tab tid sent 10-27-20 # 90 1 refill. Will key up enough medication to last her till next appointment. Please advise.

## 2020-12-28 ENCOUNTER — HOSPITAL ENCOUNTER (EMERGENCY)
Age: 55
Discharge: HOME | End: 2020-12-28
Attending: EMERGENCY MEDICINE
Payer: COMMERCIAL

## 2020-12-28 VITALS
HEART RATE: 72 BPM | SYSTOLIC BLOOD PRESSURE: 117 MMHG | TEMPERATURE: 98 F | WEIGHT: 220 LBS | RESPIRATION RATE: 16 BRPM | BODY MASS INDEX: 34.46 KG/M2 | OXYGEN SATURATION: 98 % | DIASTOLIC BLOOD PRESSURE: 78 MMHG

## 2020-12-28 LAB
BACTERIA: ABNORMAL /HPF
BILIRUBIN URINE: NEGATIVE
BLOOD, URINE: NEGATIVE
CLARITY: CLEAR
COLOR: YELLOW
EPITHELIAL CELLS, UA: ABNORMAL /HPF
GLUCOSE URINE: NEGATIVE MG/DL
KETONES, URINE: NEGATIVE MG/DL
LEUKOCYTE ESTERASE, URINE: NEGATIVE
NITRITE, URINE: NEGATIVE
PH UA: 5.5 (ref 5–9)
PROTEIN UA: NEGATIVE MG/DL
RBC UA: ABNORMAL /HPF (ref 0–2)
SPECIFIC GRAVITY UA: >=1.03 (ref 1–1.03)
UROBILINOGEN, URINE: 0.2 E.U./DL
WBC UA: ABNORMAL /HPF (ref 0–5)

## 2020-12-28 PROCEDURE — G0382 LEV 3 HOSP TYPE B ED VISIT: HCPCS

## 2020-12-28 PROCEDURE — 81001 URINALYSIS AUTO W/SCOPE: CPT

## 2020-12-28 PROCEDURE — 6360000002 HC RX W HCPCS: Performed by: EMERGENCY MEDICINE

## 2020-12-28 PROCEDURE — 96372 THER/PROPH/DIAG INJ SC/IM: CPT

## 2020-12-28 RX ORDER — CALCIUM CARBONATE 500(1250)
500 TABLET ORAL DAILY
COMMUNITY

## 2020-12-28 RX ORDER — MULTIVIT-MIN/IRON/FOLIC ACID/K 18-600-40
CAPSULE ORAL DAILY
COMMUNITY

## 2020-12-28 RX ORDER — KETOROLAC TROMETHAMINE 30 MG/ML
30 INJECTION, SOLUTION INTRAMUSCULAR; INTRAVENOUS ONCE
Status: COMPLETED | OUTPATIENT
Start: 2020-12-28 | End: 2020-12-28

## 2020-12-28 RX ORDER — DEXAMETHASONE SODIUM PHOSPHATE 10 MG/ML
10 INJECTION, SOLUTION INTRAMUSCULAR; INTRAVENOUS ONCE
Status: COMPLETED | OUTPATIENT
Start: 2020-12-28 | End: 2020-12-28

## 2020-12-28 RX ORDER — METHYLPREDNISOLONE 4 MG/1
TABLET ORAL
Qty: 1 KIT | Refills: 0 | Status: SHIPPED | OUTPATIENT
Start: 2020-12-28 | End: 2021-01-03

## 2020-12-28 RX ADMIN — DEXAMETHASONE SODIUM PHOSPHATE 10 MG: 10 INJECTION, SOLUTION INTRAMUSCULAR; INTRAVENOUS at 11:55

## 2020-12-28 RX ADMIN — KETOROLAC TROMETHAMINE 30 MG: 30 INJECTION, SOLUTION INTRAMUSCULAR; INTRAVENOUS at 11:57

## 2020-12-28 ASSESSMENT — PAIN DESCRIPTION - ORIENTATION: ORIENTATION: RIGHT

## 2020-12-28 ASSESSMENT — PAIN DESCRIPTION - PAIN TYPE: TYPE: ACUTE PAIN

## 2020-12-28 ASSESSMENT — ENCOUNTER SYMPTOMS
NAUSEA: 0
SHORTNESS OF BREATH: 0
ABDOMINAL DISTENTION: 0
DIARRHEA: 0
SORE THROAT: 0
COUGH: 0
VOMITING: 0
EYE DISCHARGE: 0
EYE PAIN: 0
EYE REDNESS: 0
BACK PAIN: 1
SINUS PRESSURE: 0
WHEEZING: 0

## 2020-12-28 ASSESSMENT — PAIN DESCRIPTION - PROGRESSION
CLINICAL_PROGRESSION: NOT CHANGED
CLINICAL_PROGRESSION: NOT CHANGED

## 2020-12-28 ASSESSMENT — PAIN DESCRIPTION - FREQUENCY: FREQUENCY: CONTINUOUS

## 2020-12-28 ASSESSMENT — PAIN SCALES - GENERAL: PAINLEVEL_OUTOF10: 4

## 2020-12-28 ASSESSMENT — PAIN DESCRIPTION - LOCATION: LOCATION: GROIN

## 2020-12-28 ASSESSMENT — PAIN DESCRIPTION - DESCRIPTORS: DESCRIPTORS: ACHING

## 2020-12-28 NOTE — LETTER
[facility] [address] [place] [address] Emergency Department  [address] [address]  Phone: [phone]               December 28, 2020    Patient: Santo Rob   YOB: 1965   Date of Visit: 12/28/2020       To Whom It May Concern:    Zuri Teague was seen and treated in our emergency department on 12/28/2020. She may return to work on 12/30/2020.       Sincerely,       Jamaica Bravo RN         Signature:__________________________________

## 2020-12-28 NOTE — ED PROVIDER NOTES
The history is provided by the patient. Dysuria   This is a new problem. The current episode started more than 1 week ago. The problem occurs every urination. The quality of the pain is described as burning. The pain is moderate. Associated symptoms include frequency and urgency. Pertinent negatives include no chills, no nausea and no vomiting. Back Pain  Location:  Lumbar spine  Quality:  Aching and shooting  Radiates to:  L posterior upper leg  Pain severity:  Moderate  Onset quality:  Gradual  Duration:  1 month  Associated symptoms: dysuria and leg pain    Associated symptoms: no chest pain, no fever, no headaches and no weakness         Review of Systems   Constitutional: Negative for chills and fever. HENT: Negative for ear pain, sinus pressure and sore throat. Eyes: Negative for pain, discharge and redness. Respiratory: Negative for cough, shortness of breath and wheezing. Cardiovascular: Negative for chest pain. Gastrointestinal: Negative for abdominal distention, diarrhea, nausea and vomiting. Genitourinary: Positive for dysuria, frequency and urgency. Musculoskeletal: Positive for back pain. Negative for arthralgias. Skin: Negative for rash and wound. Neurological: Negative for weakness and headaches. Hematological: Negative for adenopathy. All other systems reviewed and are negative. Physical Exam  Vitals signs and nursing note reviewed. Constitutional:       Appearance: She is well-developed. HENT:      Head: Normocephalic and atraumatic. Right Ear: Hearing and external ear normal.      Left Ear: Hearing and external ear normal.      Nose: Nose normal.      Mouth/Throat:      Pharynx: Uvula midline. Eyes:      General: Lids are normal.      Conjunctiva/sclera: Conjunctivae normal.      Pupils: Pupils are equal, round, and reactive to light. Neck:      Musculoskeletal: Normal range of motion and neck supple.    Cardiovascular:      Rate and Rhythm: Normal rate and regular rhythm. Heart sounds: Normal heart sounds. No murmur. Pulmonary:      Effort: Pulmonary effort is normal. No respiratory distress. Breath sounds: Normal breath sounds. No wheezing or rales. Abdominal:      General: Bowel sounds are normal.      Palpations: Abdomen is soft. Abdomen is not rigid. Tenderness: There is no abdominal tenderness. There is no guarding or rebound. Musculoskeletal:      Lumbar back: She exhibits decreased range of motion and tenderness. Back:    Skin:     General: Skin is warm and dry. Findings: No abrasion or rash. Neurological:      Mental Status: She is alert and oriented to person, place, and time. GCS: GCS eye subscore is 4. GCS verbal subscore is 5. GCS motor subscore is 6. Cranial Nerves: No cranial nerve deficit. Sensory: No sensory deficit. Coordination: Coordination normal.      Gait: Gait normal.          Procedures     MDM          --------------------------------------------- PAST HISTORY ---------------------------------------------  Past Medical History:  has a past medical history of Anesthesia, Gastritis, Headache, History of muscle pain, Osteoarthritis, and Osteoporosis. Past Surgical History:  has a past surgical history that includes Cholecystectomy; Hysterectomy; tumor removal; other surgical history (Right, 03/07/14); knee surgery (Right); HYSTERECTOMY ABDOMINAL (N/A, 2/14/2019); Foot surgery; and hip surgery (09/09/2019). Social History:  reports that she quit smoking about 6 years ago. Her smoking use included cigarettes. She smoked 0.25 packs per day. She has never used smokeless tobacco. She reports current alcohol use. She reports that she does not use drugs. Family History: family history includes Cancer in her father and mother. The patients home medications have been reviewed.     Allergies: Oxycodone and Percocet [oxycodone-acetaminophen]    -------------------------------------------------- RESULTS -------------------------------------------------  Labs:  Results for orders placed or performed during the hospital encounter of 12/28/20   Urinalysis with Microscopic   Result Value Ref Range    Color, UA Yellow Straw/Yellow    Clarity, UA Clear Clear    Glucose, Ur Negative Negative mg/dL    Bilirubin Urine Negative Negative    Ketones, Urine Negative Negative mg/dL    Specific Gravity, UA >=1.030 1.005 - 1.030    Blood, Urine Negative Negative    pH, UA 5.5 5.0 - 9.0    Protein, UA Negative Negative mg/dL    Urobilinogen, Urine 0.2 <2.0 E.U./dL    Nitrite, Urine Negative Negative    Leukocyte Esterase, Urine Negative Negative    WBC, UA 1-3 0 - 5 /HPF    RBC, UA 1-3 0 - 2 /HPF    Epithelial Cells, UA FEW /HPF    Bacteria, UA RARE (A) None Seen /HPF       Radiology:  No orders to display       ------------------------- NURSING NOTES AND VITALS REVIEWED ---------------------------  Date / Time Roomed:  12/28/2020 11:31 AM  ED Bed Assignment:  01/01    The nursing notes within the ED encounter and vital signs as below have been reviewed. /78   Pulse 72   Temp 98 °F (36.7 °C) (Temporal)   Resp 16   Wt 220 lb (99.8 kg)   SpO2 98%   BMI 34.46 kg/m²   Oxygen Saturation Interpretation: Normal      ------------------------------------------ PROGRESS NOTES ------------------------------------------  I have spoken with the patient and discussed todays results, in addition to providing specific details for the plan of care and counseling regarding the diagnosis and prognosis. Their questions are answered at this time and they are agreeable with the plan. I discussed at length with them reasons for immediate return here for re evaluation. They will followup with primary care by calling their office tomorrow.     Medications   ketorolac (TORADOL) injection 30 mg (30 mg Intramuscular Given 12/28/20 4946)   dexamethasone (PF) (DECADRON) injection 10 mg (10 mg Intramuscular Given 12/28/20 1155)           --------------------------------- ADDITIONAL PROVIDER NOTES ---------------------------------  At this time the patient is without objective evidence of an acute process requiring hospitalization or inpatient management. They have remained hemodynamically stable throughout their entire ED visit and are stable for discharge with outpatient follow-up. The plan has been discussed in detail and they are aware of the specific conditions for emergent return, as well as the importance of follow-up. New Prescriptions    METHYLPREDNISOLONE (MEDROL, JONNATHAN,) 4 MG TABLET    Take by mouth. Diagnosis:  1. Sciatica of left side        Disposition:  Patient's disposition: Discharge to home  Patient's condition is stable.                       Brittaney Thompson MD  12/28/20 0232

## 2021-01-04 RX ORDER — TIZANIDINE 4 MG/1
TABLET ORAL
Qty: 72 TABLET | Refills: 0 | OUTPATIENT
Start: 2021-01-04 | End: 2021-01-28

## 2021-01-17 ENCOUNTER — HOSPITAL ENCOUNTER (EMERGENCY)
Age: 56
Discharge: HOME | End: 2021-01-17
Attending: EMERGENCY MEDICINE
Payer: COMMERCIAL

## 2021-01-17 VITALS
TEMPERATURE: 98.2 F | DIASTOLIC BLOOD PRESSURE: 86 MMHG | BODY MASS INDEX: 35.36 KG/M2 | HEIGHT: 66 IN | SYSTOLIC BLOOD PRESSURE: 131 MMHG | WEIGHT: 220 LBS | RESPIRATION RATE: 18 BRPM | OXYGEN SATURATION: 95 % | HEART RATE: 90 BPM

## 2021-01-17 DIAGNOSIS — R10.9 ABDOMINAL CRAMPING: ICD-10-CM

## 2021-01-17 DIAGNOSIS — E87.6 HYPOKALEMIA: ICD-10-CM

## 2021-01-17 DIAGNOSIS — E83.42 HYPOMAGNESEMIA: ICD-10-CM

## 2021-01-17 DIAGNOSIS — R11.2 NON-INTRACTABLE VOMITING WITH NAUSEA, UNSPECIFIED VOMITING TYPE: Primary | ICD-10-CM

## 2021-01-17 LAB
ALBUMIN SERPL-MCNC: 3.4 G/DL (ref 3.5–5.2)
ALP BLD-CCNC: 90 U/L (ref 35–104)
ALT SERPL-CCNC: 38 U/L (ref 0–32)
ANION GAP SERPL CALCULATED.3IONS-SCNC: 11 MMOL/L (ref 7–16)
AST SERPL-CCNC: 24 U/L (ref 0–31)
BACTERIA: ABNORMAL /HPF
BASOPHILS ABSOLUTE: 0.03 E9/L (ref 0–0.2)
BASOPHILS RELATIVE PERCENT: 0.4 % (ref 0–2)
BILIRUB SERPL-MCNC: 0.4 MG/DL (ref 0–1.2)
BILIRUBIN URINE: NEGATIVE
BLOOD, URINE: ABNORMAL
BUN BLDV-MCNC: 9 MG/DL (ref 6–20)
CALCIUM SERPL-MCNC: 8.8 MG/DL (ref 8.6–10.2)
CHLORIDE BLD-SCNC: 103 MMOL/L (ref 98–107)
CLARITY: CLEAR
CO2: 27 MMOL/L (ref 22–29)
COLOR: YELLOW
CREAT SERPL-MCNC: 0.8 MG/DL (ref 0.5–1)
EOSINOPHILS ABSOLUTE: 0.06 E9/L (ref 0.05–0.5)
EOSINOPHILS RELATIVE PERCENT: 0.7 % (ref 0–6)
EPITHELIAL CELLS, UA: ABNORMAL /HPF
GFR AFRICAN AMERICAN: >60
GFR NON-AFRICAN AMERICAN: >60 ML/MIN/1.73
GLUCOSE BLD-MCNC: 140 MG/DL (ref 74–99)
GLUCOSE URINE: NEGATIVE MG/DL
HCT VFR BLD CALC: 36.5 % (ref 34–48)
HEMOGLOBIN: 12.2 G/DL (ref 11.5–15.5)
IMMATURE GRANULOCYTES #: 0.04 E9/L
IMMATURE GRANULOCYTES %: 0.5 % (ref 0–5)
KETONES, URINE: NEGATIVE MG/DL
LEUKOCYTE ESTERASE, URINE: ABNORMAL
LIPASE: 14 U/L (ref 13–60)
LYMPHOCYTES ABSOLUTE: 1.6 E9/L (ref 1.5–4)
LYMPHOCYTES RELATIVE PERCENT: 19.3 % (ref 20–42)
MAGNESIUM: 1.2 MG/DL (ref 1.6–2.6)
MCH RBC QN AUTO: 29.5 PG (ref 26–35)
MCHC RBC AUTO-ENTMCNC: 33.4 % (ref 32–34.5)
MCV RBC AUTO: 88.2 FL (ref 80–99.9)
MONOCYTES ABSOLUTE: 0.65 E9/L (ref 0.1–0.95)
MONOCYTES RELATIVE PERCENT: 7.8 % (ref 2–12)
NEUTROPHILS ABSOLUTE: 5.91 E9/L (ref 1.8–7.3)
NEUTROPHILS RELATIVE PERCENT: 71.3 % (ref 43–80)
NITRITE, URINE: NEGATIVE
PDW BLD-RTO: 12 FL (ref 11.5–15)
PH UA: 5.5 (ref 5–9)
PLATELET # BLD: 356 E9/L (ref 130–450)
PMV BLD AUTO: 8.9 FL (ref 7–12)
POTASSIUM REFLEX MAGNESIUM: 3.3 MMOL/L (ref 3.5–5)
PROTEIN UA: NEGATIVE MG/DL
RBC # BLD: 4.14 E12/L (ref 3.5–5.5)
RBC UA: ABNORMAL /HPF (ref 0–2)
SODIUM BLD-SCNC: 141 MMOL/L (ref 132–146)
SPECIFIC GRAVITY UA: 1.01 (ref 1–1.03)
TOTAL PROTEIN: 6.9 G/DL (ref 6.4–8.3)
UROBILINOGEN, URINE: 0.2 E.U./DL
WBC # BLD: 8.3 E9/L (ref 4.5–11.5)
WBC UA: ABNORMAL /HPF (ref 0–5)

## 2021-01-17 PROCEDURE — 96361 HYDRATE IV INFUSION ADD-ON: CPT

## 2021-01-17 PROCEDURE — 6370000000 HC RX 637 (ALT 250 FOR IP): Performed by: EMERGENCY MEDICINE

## 2021-01-17 PROCEDURE — 99283 EMERGENCY DEPT VISIT LOW MDM: CPT

## 2021-01-17 PROCEDURE — 80053 COMPREHEN METABOLIC PANEL: CPT

## 2021-01-17 PROCEDURE — 2580000003 HC RX 258: Performed by: EMERGENCY MEDICINE

## 2021-01-17 PROCEDURE — 6360000002 HC RX W HCPCS: Performed by: EMERGENCY MEDICINE

## 2021-01-17 PROCEDURE — 83735 ASSAY OF MAGNESIUM: CPT

## 2021-01-17 PROCEDURE — 96375 TX/PRO/DX INJ NEW DRUG ADDON: CPT

## 2021-01-17 PROCEDURE — 81001 URINALYSIS AUTO W/SCOPE: CPT

## 2021-01-17 PROCEDURE — 83690 ASSAY OF LIPASE: CPT

## 2021-01-17 PROCEDURE — 85025 COMPLETE CBC W/AUTO DIFF WBC: CPT

## 2021-01-17 PROCEDURE — 96374 THER/PROPH/DIAG INJ IV PUSH: CPT

## 2021-01-17 RX ORDER — ONDANSETRON 4 MG/1
4 TABLET, ORALLY DISINTEGRATING ORAL EVERY 8 HOURS PRN
Qty: 10 TABLET | Refills: 0 | Status: SHIPPED | OUTPATIENT
Start: 2021-01-17 | End: 2021-08-23 | Stop reason: ALTCHOICE

## 2021-01-17 RX ORDER — METOCLOPRAMIDE HYDROCHLORIDE 5 MG/ML
10 INJECTION INTRAMUSCULAR; INTRAVENOUS ONCE
Status: COMPLETED | OUTPATIENT
Start: 2021-01-17 | End: 2021-01-17

## 2021-01-17 RX ORDER — 0.9 % SODIUM CHLORIDE 0.9 %
1000 INTRAVENOUS SOLUTION INTRAVENOUS ONCE
Status: COMPLETED | OUTPATIENT
Start: 2021-01-17 | End: 2021-01-17

## 2021-01-17 RX ORDER — DIPHENHYDRAMINE HYDROCHLORIDE 50 MG/ML
25 INJECTION INTRAMUSCULAR; INTRAVENOUS ONCE
Status: COMPLETED | OUTPATIENT
Start: 2021-01-17 | End: 2021-01-17

## 2021-01-17 RX ORDER — ONDANSETRON 2 MG/ML
4 INJECTION INTRAMUSCULAR; INTRAVENOUS ONCE
Status: COMPLETED | OUTPATIENT
Start: 2021-01-17 | End: 2021-01-17

## 2021-01-17 RX ORDER — KETOROLAC TROMETHAMINE 30 MG/ML
15 INJECTION, SOLUTION INTRAMUSCULAR; INTRAVENOUS ONCE
Status: COMPLETED | OUTPATIENT
Start: 2021-01-17 | End: 2021-01-17

## 2021-01-17 RX ORDER — MAGNESIUM OXIDE 400 MG/1
400 TABLET ORAL ONCE
Status: COMPLETED | OUTPATIENT
Start: 2021-01-17 | End: 2021-01-17

## 2021-01-17 RX ADMIN — POTASSIUM BICARBONATE 40 MEQ: 782 TABLET, EFFERVESCENT ORAL at 13:39

## 2021-01-17 RX ADMIN — DIPHENHYDRAMINE HYDROCHLORIDE 25 MG: 50 INJECTION, SOLUTION INTRAMUSCULAR; INTRAVENOUS at 12:51

## 2021-01-17 RX ADMIN — MAGNESIUM OXIDE 400 MG: 400 TABLET ORAL at 13:39

## 2021-01-17 RX ADMIN — ONDANSETRON 4 MG: 2 INJECTION INTRAMUSCULAR; INTRAVENOUS at 11:53

## 2021-01-17 RX ADMIN — SODIUM CHLORIDE 1000 ML: 9 INJECTION, SOLUTION INTRAVENOUS at 11:52

## 2021-01-17 RX ADMIN — SODIUM CHLORIDE 1000 ML: 9 INJECTION, SOLUTION INTRAVENOUS at 11:53

## 2021-01-17 RX ADMIN — METOCLOPRAMIDE 10 MG: 5 INJECTION, SOLUTION INTRAMUSCULAR; INTRAVENOUS at 12:51

## 2021-01-17 RX ADMIN — KETOROLAC TROMETHAMINE 15 MG: 30 INJECTION, SOLUTION INTRAMUSCULAR; INTRAVENOUS at 11:53

## 2021-01-17 ASSESSMENT — PAIN SCALES - GENERAL: PAINLEVEL_OUTOF10: 5

## 2021-01-17 ASSESSMENT — PAIN DESCRIPTION - ORIENTATION: ORIENTATION: MID

## 2021-01-17 ASSESSMENT — ENCOUNTER SYMPTOMS
NAUSEA: 1
COLOR CHANGE: 0
BACK PAIN: 0
ABDOMINAL PAIN: 1
VOMITING: 1
COUGH: 0
DIARRHEA: 0
ABDOMINAL DISTENTION: 0
SHORTNESS OF BREATH: 0
RHINORRHEA: 0
BLOOD IN STOOL: 0
CONSTIPATION: 0

## 2021-01-17 ASSESSMENT — PAIN DESCRIPTION - LOCATION: LOCATION: ABDOMEN

## 2021-01-17 ASSESSMENT — PAIN DESCRIPTION - FREQUENCY: FREQUENCY: CONTINUOUS

## 2021-01-17 ASSESSMENT — PAIN DESCRIPTION - DESCRIPTORS: DESCRIPTORS: ACHING

## 2021-01-17 NOTE — DISCHARGE INSTR - COC
Nursing Mobility/ADLs:  Walking   {CHP DME MIWW:985423517}  Transfer  {CHP DME PVYB:545841142}  Bathing  {CHP DME KSYL:046954248}  Dressing  {CHP DME TGEP:362524429}  Toileting  {CHP DME QUKP:390248615}  Feeding  {CHP DME NHFP:457077741}  Med Admin  {P DME IZUV:814206333}  Med Delivery   {[facility] MED Delivery:672490427}    Wound Care Documentation and Therapy:        Elimination:  Continence:   · Bowel: {YES / QN:64473}  · Bladder: {YES / E}  Urinary Catheter: {Urinary Catheter:700279052}   Colostomy/Ileostomy/Ileal Conduit: {YES / NA:13231}       Date of Last BM: ***  No intake or output data in the 24 hours ending 21 1336  No intake/output data recorded.     Safety Concerns:     [employer] Safety Concerns:409673410}    Impairments/Disabilities:      [employer] Impairments/Disabilities:643115129}    Nutrition Therapy:  Current Nutrition Therapy:   [employer] Diet List:180436726}    Routes of Feeding: {[facility] DME Other Feedings:237393862}  Liquids: {Slp liquid thickness:86407}  Daily Fluid Restriction: {P DME Yes amt example:798711526}  Last Modified Barium Swallow with Video (Video Swallowing Test): {Done Not Done HYHD:117883110}    Treatments at the Time of Hospital Discharge:   Respiratory Treatments: ***  Oxygen Therapy:  {Therapy; copd oxygen:68124}  Ventilator:    {[facility] Vent KYRT:218616605}    Rehab Therapies: {THERAPEUTIC INTERVENTION:2557780937}  Weight Bearing Status/Restrictions: [employer] Weight Bearin}  Other Medical Equipment (for information only, NOT a DME order):  {EQUIPMENT:849180026}  Other Treatments: ***    Patient's personal belongings (please select all that are sent with patient):  {[facility] DME Belongings:009499602}    RN SIGNATURE:  {Esignature:422835285}    CASE MANAGEMENT/SOCIAL WORK SECTION    Inpatient Status Date: ***    Readmission Risk Assessment Score:  Readmission Risk              Risk of Unplanned Readmission:        0           Discharging to Facility/ Agency   · Name: · Address:  · Phone:  · Fax:    Dialysis Facility (if applicable)   · Name:  · Address:  · Dialysis Schedule:  · Phone:  · Fax:    / signature: {Esignature:349271402}    PHYSICIAN SECTION    Prognosis: {Prognosis:9750278043}    Condition at Discharge: Chalino Woodard Patient Condition:544811410}    Rehab Potential (if transferring to Rehab): {Prognosis:0033517392}    Recommended Labs or Other Treatments After Discharge: ***    Physician Certification: I certify the above information and transfer of Rama Anton  is necessary for the continuing treatment of the diagnosis listed and that she requires {Admit to Appropriate Level of Care:54384} for {GREATER/LESS:536108064} 30 days.      Update Admission H&P: {CHP DME Changes in DLKFS:638139122}    PHYSICIAN SIGNATURE:  {Esignature:018613922}

## 2021-01-17 NOTE — ED PROVIDER NOTES
All other systems reviewed and are negative. Physical Exam  Vitals signs and nursing note reviewed. Constitutional:       General: She is not in acute distress. Appearance: She is well-developed and normal weight. She is not diaphoretic. Comments: Patient resting in bed in no acute distress   HENT:      Head: Normocephalic and atraumatic. Mouth/Throat:      Mouth: Mucous membranes are moist.   Eyes:      General: No scleral icterus. Conjunctiva/sclera: Conjunctivae normal.   Neck:      Musculoskeletal: Normal range of motion and neck supple. Cardiovascular:      Rate and Rhythm: Regular rhythm. Tachycardia present. Heart sounds: Normal heart sounds. No murmur. Pulmonary:      Effort: Pulmonary effort is normal. No respiratory distress. Breath sounds: Normal breath sounds. No wheezing or rales. Abdominal:      General: Bowel sounds are normal. There is no distension. Palpations: Abdomen is soft. Tenderness: There is generalized abdominal tenderness. There is no right CVA tenderness, left CVA tenderness, guarding or rebound. Negative signs include McBurney's sign. Skin:     General: Skin is warm and dry. Coloration: Skin is not jaundiced or pale. Neurological:      Mental Status: She is alert and oriented to person, place, and time. Cranial Nerves: No cranial nerve deficit.       Coordination: Coordination normal.          Procedures     MDM Patient presented to the ED for evaluation of nausea and vomiting with some associated abdominal cramping. No diarrhea reported. No blood in the stool. Today's labs reveal urinalysis that showed no evidence of urinary tract infection. CBC shows no leukocytosis or evidence of anemia. CMP shows mild hypokalemia at 3.3 and a low magnesium at 1.2. The magnesium and potassium was supplemented. Patient was given Toradol, Reglan, Benadryl, and Zofran with improved symptoms. She was also given IV fluids. She is comfortably discharged home and will follow up with her PCP. She understands if she is worsening symptoms or new concerns that she can return to the ED for further evaluation. ED Course as of Jan 17 1337   Kam Worley Jan 17, 2021   1246 Patient resting in bed. States that her abdominal cramping has improved but she still has a headache. Will order additional meds and reassess. Discussed results of labs thus far. [MS]   1333 Patient states she is feeling much better after the Reglan and Benadryl. Discussed that her magnesium is slightly depressed. We will give her oral supplementation. Afterwards she is comfortable being discharged home and follow-up with her PCP. She understands if she has worsening symptoms or new concerns that she can return to the ED for further evaluation. [MS]      ED Course User Index  [MS] Beau Lewis, DO       --------------------------------------------- PAST HISTORY ---------------------------------------------  Past Medical History:  has a past medical history of Anesthesia, Gastritis, Headache, History of muscle pain, Osteoarthritis, and Osteoporosis. Past Surgical History:  has a past surgical history that includes Cholecystectomy; Hysterectomy; tumor removal; other surgical history (Right, 03/07/14); knee surgery (Right); HYSTERECTOMY ABDOMINAL (N/A, 2/14/2019); Foot surgery; and hip surgery (09/09/2019). Social History:  reports that she quit smoking about 6 years ago. Her smoking use included cigarettes. She smoked 0.25 packs per day. She has never used smokeless tobacco. She reports current alcohol use. She reports current drug use. Drug: Marijuana. Family History: family history includes Cancer in her father and mother. The patients home medications have been reviewed.     Allergies: Oxycodone and Percocet [oxycodone-acetaminophen]    -------------------------------------------------- RESULTS -------------------------------------------------  Labs:  Results for orders placed or performed during the hospital encounter of 01/17/21   CBC Auto Differential   Result Value Ref Range    WBC 8.3 4.5 - 11.5 E9/L    RBC 4.14 3.50 - 5.50 E12/L    Hemoglobin 12.2 11.5 - 15.5 g/dL    Hematocrit 36.5 34.0 - 48.0 %    MCV 88.2 80.0 - 99.9 fL    MCH 29.5 26.0 - 35.0 pg    MCHC 33.4 32.0 - 34.5 %    RDW 12.0 11.5 - 15.0 fL    Platelets 519 319 - 700 E9/L    MPV 8.9 7.0 - 12.0 fL    Neutrophils % 71.3 43.0 - 80.0 %    Immature Granulocytes % 0.5 0.0 - 5.0 %    Lymphocytes % 19.3 (L) 20.0 - 42.0 %    Monocytes % 7.8 2.0 - 12.0 %    Eosinophils % 0.7 0.0 - 6.0 %    Basophils % 0.4 0.0 - 2.0 %    Neutrophils Absolute 5.91 1.80 - 7.30 E9/L    Immature Granulocytes # 0.04 E9/L    Lymphocytes Absolute 1.60 1.50 - 4.00 E9/L    Monocytes Absolute 0.65 0.10 - 0.95 E9/L    Eosinophils Absolute 0.06 0.05 - 0.50 E9/L    Basophils Absolute 0.03 0.00 - 0.20 E9/L   Comprehensive Metabolic Panel w/ Reflex to MG   Result Value Ref Range    Sodium 141 132 - 146 mmol/L    Potassium reflex Magnesium 3.3 (L) 3.5 - 5.0 mmol/L    Chloride 103 98 - 107 mmol/L    CO2 27 22 - 29 mmol/L    Anion Gap 11 7 - 16 mmol/L    Glucose 140 (H) 74 - 99 mg/dL    BUN 9 6 - 20 mg/dL    CREATININE 0.8 0.5 - 1.0 mg/dL    GFR Non-African American >60 >=60 mL/min/1.73    GFR African American >60     Calcium 8.8 8.6 - 10.2 mg/dL    Total Protein 6.9 6.4 - 8.3 g/dL Alb 3.4 (L) 3.5 - 5.2 g/dL    Total Bilirubin 0.4 0.0 - 1.2 mg/dL    Alkaline Phosphatase 90 35 - 104 U/L    ALT 38 (H) 0 - 32 U/L    AST 24 0 - 31 U/L   Lipase   Result Value Ref Range    Lipase 14 13 - 60 U/L   Urinalysis, reflex to microscopic   Result Value Ref Range    Color, UA Yellow Straw/Yellow    Clarity, UA Clear Clear    Glucose, Ur Negative Negative mg/dL    Bilirubin Urine Negative Negative    Ketones, Urine Negative Negative mg/dL    Specific Gravity, UA 1.010 1.005 - 1.030    Blood, Urine TRACE (A) Negative    pH, UA 5.5 5.0 - 9.0    Protein, UA Negative Negative mg/dL    Urobilinogen, Urine 0.2 <2.0 E.U./dL    Nitrite, Urine Negative Negative    Leukocyte Esterase, Urine SMALL (A) Negative   Microscopic Urinalysis   Result Value Ref Range    WBC, UA 2-5 0 - 5 /HPF    RBC, UA 0-1 0 - 2 /HPF    Epithelial Cells, UA RARE /HPF    Bacteria, UA FEW (A) None Seen /HPF   Magnesium   Result Value Ref Range    Magnesium 1.2 (L) 1.6 - 2.6 mg/dL       Radiology:  No orders to display       ------------------------- NURSING NOTES AND VITALS REVIEWED ---------------------------  Date / Time Roomed:  1/17/2021 11:28 AM  ED Bed Assignment:  06/06    The nursing notes within the ED encounter and vital signs as below have been reviewed.    BP (!) 177/70   Pulse 110   Temp 98.3 °F (36.8 °C) (Oral)   Resp 18   Ht 5' 6\" (1.676 m)   Wt 220 lb (99.8 kg)   SpO2 98%   BMI 35.51 kg/m²   Oxygen Saturation Interpretation: Normal      ------------------------------------------ PROGRESS NOTES ------------------------------------------

## 2021-03-16 ENCOUNTER — OFFICE VISIT (OUTPATIENT)
Dept: PHYSICAL MEDICINE AND REHAB | Age: 56
End: 2021-03-16
Payer: COMMERCIAL

## 2021-03-16 VITALS
BODY MASS INDEX: 34.21 KG/M2 | DIASTOLIC BLOOD PRESSURE: 88 MMHG | WEIGHT: 218 LBS | HEART RATE: 94 BPM | HEIGHT: 67 IN | SYSTOLIC BLOOD PRESSURE: 144 MMHG

## 2021-03-16 DIAGNOSIS — M47.812 SPONDYLOSIS, CERVICAL: ICD-10-CM

## 2021-03-16 DIAGNOSIS — G89.29 CHRONIC NECK PAIN: Primary | ICD-10-CM

## 2021-03-16 DIAGNOSIS — M54.2 CHRONIC NECK PAIN: Primary | ICD-10-CM

## 2021-03-16 DIAGNOSIS — M54.12 CERVICAL RADICULOPATHY: ICD-10-CM

## 2021-03-16 DIAGNOSIS — M48.02 CERVICAL STENOSIS OF SPINAL CANAL: ICD-10-CM

## 2021-03-16 PROCEDURE — 99214 OFFICE O/P EST MOD 30 MIN: CPT | Performed by: PHYSICAL MEDICINE & REHABILITATION

## 2021-03-16 RX ORDER — ALBUTEROL SULFATE 4 MG/1
4 TABLET, FILM COATED, EXTENDED RELEASE ORAL EVERY 12 HOURS
COMMUNITY
End: 2021-08-23 | Stop reason: ALTCHOICE

## 2021-03-16 RX ORDER — CEFDINIR 300 MG/1
300 CAPSULE ORAL 2 TIMES DAILY
COMMUNITY
End: 2021-08-23 | Stop reason: ALTCHOICE

## 2021-03-16 RX ORDER — CHLORAL HYDRATE 500 MG
3000 CAPSULE ORAL 3 TIMES DAILY
COMMUNITY

## 2021-03-16 NOTE — PATIENT INSTRUCTIONS
Patient Education        Arthritis: Care Instructions  Overview     Arthritis, also called osteoarthritis, is a breakdown of the cartilage that cushions your joints. When the cartilage wears down, your bones rub against each other. This causes pain and stiffness. Many people have some arthritis as they age. Arthritis most often affects the joints of the spine, hands, hips, knees, or feet. Arthritis never goes away completely. But medicine and home treatment can help reduce pain and help you stay active. Follow-up care is a key part of your treatment and safety. Be sure to make and go to all appointments, and call your doctor if you are having problems. It's also a good idea to know your test results and keep a list of the medicines you take. How can you care for yourself at home? · Stay at a healthy weight. Being overweight puts extra strain on your joints. · Talk to your doctor or physical therapist about exercises that will help ease joint pain. ? Stretch. You may enjoy gentle forms of yoga to help keep your joints and muscles flexible. ? Walk instead of jog. Other types of exercise that are less stressful on the joints include riding a bike, swimming, adriane chi, or water exercise. ? Lift weights. Strong muscles help reduce stress on your joints. Stronger thigh muscles, for example, take some of the stress off of the knees and hips. Learn the right way to lift weights so you don't make joint pain worse. · Take your medicines exactly as prescribed. Call your doctor if you think you are having a problem with your medicine. · Take pain medicines exactly as directed. ? If the doctor gave you a prescription medicine for pain, take it as prescribed. ? If you are not taking a prescription pain medicine, ask your doctor if you can take an over-the-counter medicine. · Use a cane, crutch, walker, or another device if you need help to get around. These can help rest your joints.  You also can use other things to make life easier, such as a higher toilet seat and padded handles on kitchen utensils. · Do not sit in low chairs. They can make it hard to get up. · Put heat or cold on your sore joints as needed. Use whichever helps you most. You can also switch between hot and cold packs. ? Apply heat 2 or 3 times a day for 20 to 30 minutesusing a heating pad, hot shower, or hot packto relieve pain and stiffness. But don't use heat on a swollen joint. ? Put ice or a cold pack on your sore joint for 10 to 20 minutes at a time. Put a thin cloth between the ice and your skin. When should you call for help? Call your doctor now or seek immediate medical care if:    · You have sudden swelling, warmth, or pain in any joint.     · You have joint pain and a fever or rash.     · You have such bad pain that you cannot use a joint. Watch closely for changes in your health, and be sure to contact your doctor if:    · You have mild joint symptoms that continue even with more than 6 weeks of care at home.     · You have stomach pain or other problems with your medicine. Where can you learn more? Go to https://[email or web address].[employer]. org and sign in to your [employer] account. Enter E616 in the [employer] box to learn more about \"Arthritis: Care Instructions. \"     If you do not have an account, please click on the \"Sign Up Now\" link. Current as of: August 5, 2020               Content Version: 12.8  © 2006-2021 [employer] Inc[employer]. Care instructions adapted under license by [facility] ([facility]). If you have questions about a medical condition or this instruction, always ask your healthcare professional. [place] [address] any warranty or liability for your use of this information.

## 2021-03-16 NOTE — PROGRESS NOTES
Ernie Pena D.O. [facility]  [address] [address] [facility]  Phone: [phone]  Fax: [phone]        3/16/21    Chief Complaint   Patient presents with    Neck Pain       HPI:  Laurinda Mohs is a 50y.o. year old woman seen today in follow up regarding neck pain. Interval history: Since the last visit the patient was diagnosed with COVID. She was unnble to do the previously recommended treatments due to being quarantined for COVID. She plans to return to work Monday pending medical clearance. Today, the pain is rated Pain Score:   3 where 0 is no pain and 10 is pain as bad as it can be. The pain is located in the neck,  radiates distally to the left shoulder, and is described as aching, swollen, tight. This pain occurs all day. The symptoms have been unchanged since onset. Symptoms are exacerbated by turning head. Factors which relieve the pain include rest. Other associated symptoms include swelling, paresthesias. Otherwise, the pain assessment has not changed since the last visit.      Past Medical History:   Diagnosis Date    Anesthesia     shivers bad when wakes up    Gastritis     Headache     History of muscle pain     IBS (irritable bowel syndrome) 2021    Osteoarthritis     Osteoporosis 2018       Past Surgical History:   Procedure Laterality Date    CHOLECYSTECTOMY      FOOT SURGERY      HIP SURGERY  09/09/2019    Left Hip Replacement    HYSTERECTOMY      HYSTERECTOMY ABDOMINAL N/A 2/14/2019    LAPAROSCOPY ROBOT XI ASSISTED REMOVAL OF CERVICAL STUMP WITH BSO CYSTOSCOPY performed by Shannen Ye [facility] Right     torn meniscus    OTHER SURGICAL HISTORY Right 03/07/14    second metatarsal cuneformjoint fusion with bone marrow aspirate    TUMOR REMOVAL      back       Social History     Tobacco Use    Smoking status: Former Smoker     Packs/day: 0.25     Types: Cigarettes     Quit date: 10/11/2014     Years since quittin.4    Smokeless tobacco: Never Used   Substance Use Topics    Alcohol use: Yes     Comment: occas    Drug use: Yes     Types: Marijuana       Family History   Problem Relation Age of Onset    Cancer Mother     Cancer Father        Current Outpatient Medications   Medication Sig Dispense Refill    albuterol (VOSPIRE ER) 4 MG extended release tablet Take 4 mg by mouth every 12 hours      cefdinir (OMNICEF) 300 MG capsule Take 300 mg by mouth 2 times daily      Omega-3 Fatty Acids (FISH OIL) 1000 MG CAPS Take 3,000 mg by mouth 3 times daily      Cholecalciferol (VITAMIN D) 50 MCG (2000 UT) CAPS capsule Take by mouth daily      calcium carbonate (OSCAL) 500 MG TABS tablet Take 500 mg by mouth daily 2 tablets      lidocaine (LIDODERM) 5 % APPLY 1 PATCH DAILY FOR AT LEAST 12 HOURS TO PAINFUL AREA      medical marijuana Take by mouth as needed.  Probiotic Product (PROBIOTIC PO) Take by mouth      Multiple Vitamins-Minerals (THERAPEUTIC MULTIVITAMIN-MINERALS) tablet Take 1 tablet by mouth daily      aspirin 81 MG tablet Take 81 mg by mouth daily      ibuprofen (ADVIL;MOTRIN) 800 MG tablet Take 1 tablet by mouth every 8 hours as needed for Pain 30 tablet 0    ondansetron (ZOFRAN ODT) 4 MG disintegrating tablet Take 1 tablet by mouth every 8 hours as needed for Nausea or Vomiting (Patient not taking: Reported on 3/16/2021) 10 tablet 0     No current facility-administered medications for this visit. Allergies   Allergen Reactions    Oxycodone Swelling    Percocet [Oxycodone-Acetaminophen] Swelling       Review of Systems:  No new weakness, paresthesia, incontinence of bowel or bladder, saddle anesthesia, falls or gait dysfunction. Otherwise, per HPI. Physical Exam:   Blood pressure (!) 144/88, pulse 94, height 5' 7\" (1.702 m), weight 218 lb (98.9 kg). GENERAL: The patient is in no apparent distress. Body habitus is obese.   HEENT: No rhinorrhea, sneezing, yawning, or lacrimation. No scleral icterus or conjunctival injection. SKIN: No piloerection. No tract marks. No rash. PSYCH: Mood and affect are appropriate. Hygiene is appropriate. CARDIOVASCULAR  Heart is regular rate and rhythm. There is no edema. RESPIRATORY: Respirations are regular and unlabored. There is no cyanosis. GASTROINTESTINAL: Soft abdomen, non-tender. MSK: Cervical: Cervical lordosis increased,  thoracic kyphosis normal and lumbar lordosis normal. No superficial or bony tenderness. + tenderness to palpation at bilateral cervical paraspinals, bilateral upper trapezius,   sternocleidomastoid,  medial scapular muscles,  Scalenes with spasm left greater than right. No tenderness of occipital notch,No trigger points. No edema, erythema, ecchymosis, effusion, instability, mass or deformity. No midline bony tenderness. Spurling is positive left. Cervical AROM in flexion is 60 degrees, in extension is 20 degrees, in left rotation is 60degrees, in right rotation is 70  degrees, in left lateral flexion is 40 degrees and in right lateral flexion is 30 degrees. There is no crepitus. bilateral Shoulder: No edema, erythema, ecchymosis, effusion, instability, mass or deformity. Full painless ROM bilateral shoulders. No painful arc. No crepitus. No tenderness to palpation at the acromioclavicular joint, subacromial space or biceps tendon insertion. Negative Randy-Wall, Scarf,  Drop arm, and Speeds. Neurologic: Awake, alert and oriented in three planes. Speech is fluent. No facial weakness. Tongue is midline. Hearing is intact for conversation. Pupils are equal and round. Extraocular muscles are intact. Hearing is intact for conversation. Shoulder shrug symmetric. Sensation: Intact for light touch and pin prick in all upper and lower extremity dermatomes. Vibration and proprioception are intact at the bilateral first MTP.   Strength: 5/5 in all myotomes in the upper and lower extremities. Muscle Tendon Reflexes: 2+ symmetric in the bilateral upper and lower extremities. Babinski is downgoing bilaterally. Meme Lis is negative bilaterally. Gait is Normal.   Romberg is negative. Heel and toe walk are normal.  Tandem walk is normal.  No clonus or spasticity. The patient was able to rise from a chair and squat without difficulty. There is no tremor. Impression:   1. Chronic neck pain    2. Spondylosis, cervical    3. Cervical stenosis of spinal canal    4. Cervical radiculopathy        Plan:  Start acupuncture and massage therapy as previously planned. Continue home exercise program.   Continue current medications  The patient was educated about the diagnosis, prognosis, indications, risks and benefits of treatment. An opportunity to ask questions was given to the patient and questions were answered. The patient agreed to proceed with the recommended treatment as described above. Return in about 3 months (around 6/16/2021). Thank you for allowing me to participate in the care of your patient. Tracie Vizcaino D.O., P.T.   Board Certified Physical Medicine and Rehabilitation  Board Certified Electrodiagnostic Medicine

## 2021-06-21 ENCOUNTER — OFFICE VISIT (OUTPATIENT)
Dept: PHYSICAL MEDICINE AND REHAB | Age: 56
End: 2021-06-21
Payer: COMMERCIAL

## 2021-06-21 VITALS
SYSTOLIC BLOOD PRESSURE: 116 MMHG | WEIGHT: 195 LBS | DIASTOLIC BLOOD PRESSURE: 77 MMHG | HEART RATE: 81 BPM | HEIGHT: 67 IN | BODY MASS INDEX: 30.61 KG/M2

## 2021-06-21 DIAGNOSIS — M54.2 CHRONIC NECK PAIN: ICD-10-CM

## 2021-06-21 DIAGNOSIS — G89.29 CHRONIC NECK PAIN: ICD-10-CM

## 2021-06-21 DIAGNOSIS — M47.812 SPONDYLOSIS, CERVICAL: Primary | ICD-10-CM

## 2021-06-21 DIAGNOSIS — S13.9XXS NECK SPRAIN, SEQUELA: ICD-10-CM

## 2021-06-21 DIAGNOSIS — M48.02 CERVICAL STENOSIS OF SPINAL CANAL: ICD-10-CM

## 2021-06-21 PROCEDURE — 99214 OFFICE O/P EST MOD 30 MIN: CPT | Performed by: PHYSICAL MEDICINE & REHABILITATION

## 2021-06-21 RX ORDER — MOMETASONE FUROATE AND FORMOTEROL FUMARATE DIHYDRATE 200; 5 UG/1; UG/1
AEROSOL RESPIRATORY (INHALATION)
COMMUNITY
Start: 2021-06-12 | End: 2021-08-23 | Stop reason: ALTCHOICE

## 2021-06-21 NOTE — PATIENT INSTRUCTIONS
Patient Education        Neck Strain or Sprain: Rehab Exercises  Introduction  Here are some examples of exercises for you to try. The exercises may be suggested for a condition or for rehabilitation. Start each exercise slowly. Ease off the exercises if you start to have pain. You will be told when to start these exercises and which ones will work best for you. How to do the exercises  Neck rotation   1. Sit in a firm chair, or stand up straight. 2. Keeping your chin level, turn your head to the right, and hold for 15 to 30 seconds. 3. Turn your head to the left and hold for 15 to 30 seconds. 4. Repeat 2 to 4 times to each side. Neck stretches   1. Look straight ahead, and tip your right ear to your right shoulder. Do not let your left shoulder rise up as you tip your head to the right. 2. Hold for 15 to 30 seconds. 3. Tilt your head to the left. Do not let your right shoulder rise up as you tip your head to the left. 4. Hold for 15 to 30 seconds. 5. Repeat 2 to 4 times to each side. Forward neck flexion   1. Sit in a firm chair, or stand up straight. 2. Bend your head forward. 3. Hold for 15 to 30 seconds. 4. Repeat 2 to 4 times. Lateral (side) bend strengthening   1. With your right hand, place your first two fingers on your right temple. 2. Start to bend your head to the side while using gentle pressure from your fingers to keep your head from bending. 3. Hold for about 6 seconds. 4. Repeat 8 to 12 times. 5. Switch hands and repeat the same exercise on your left side. Forward bend strengthening   1. Place your first two fingers of either hand on your forehead. 2. Start to bend your head forward while using gentle pressure from your fingers to keep your head from bending. 3. Hold for about 6 seconds. 4. Repeat 8 to 12 times. Neutral position strengthening   1. Using one hand, place your fingertips on the back of your head at the top of your neck.   2. Start to bend your head backward while using gentle pressure from your fingers to keep your head from bending. 3. Hold for about 6 seconds. 4. Repeat 8 to 12 times. Chin tuck   1. Lie on the floor with a rolled-up towel under your neck. Your head should be touching the floor. 2. Slowly bring your chin toward your chest.  3. Hold for a count of 6, and then relax for up to 10 seconds. 4. Repeat 8 to 12 times. Follow-up care is a key part of your treatment and safety. Be sure to make and go to all appointments, and call your doctor if you are having problems. It's also a good idea to know your test results and keep a list of the medicines you take. Where can you learn more? Go to https://[employer]. org and sign in to your [employer] account. Enter M679 in the Newsana box to learn more about \"Neck Strain or Sprain: Rehab Exercises. \"     If you do not have an account, please click on the \"Sign Up Now\" link. Current as of: November 16, 2020               Content Version: 12.9  © 4819-9908 [employer]. Care instructions adapted under license by [facility] ([facility]). If you have questions about a medical condition or this instruction, always ask your healthcare professional. [address] any warranty or liability for your use of this information. Patient Education        Shoulder Blade: Exercises  Introduction  Here are some examples of exercises for you to try. The exercises may be suggested for a condition or for rehabilitation. Start each exercise slowly. Ease off the exercises if you start to have pain. You will be told when to start these exercises and which ones will work best for you. How to do the exercises  Shoulder roll   1. Stand tall with your chin slightly tucked. Imagine that a string at the top of your head is pulling you straight up. 2. Keep your arms relaxed. All motion will be in your shoulders.   3. Shrug your shoulders up toward your ears, then up and back. [place] your shoulders down and back, like you're sliding your hands down into your back pants pockets. 4. Repeat the circles at least 2 to 4 times. 5. This exercise is also helpful anytime you want to relax. Lower neck and upper back stretch   1. With your arms about shoulder height, clasp your hands in front of you. 2. Drop your chin toward your chest.  3. Reach straight forward so you are rounding your upper back. Think about pulling your shoulder blades apart. Clive Lenz feel a stretch across your upper back and shoulders. Hold for at least 6 seconds. 4. Repeat 2 to 4 times. Triceps stretch   1. Reach your arm straight up. 2. Keeping your elbow in place, bend your arm and reach your hand down behind your back. 3. With your other hand, apply gentle pressure to the bent elbow. Clive Lenz feel a stretch at the back of your upper arm and shoulder. Hold about 6 seconds. 4. Repeat 2 to 4 times with each arm. Shoulder stretch   1. Relax your shoulders. 2. Raise one arm to shoulder height, and reach it across your chest.  3. Pull the arm slightly toward you with your other arm. This will help you get a gentle stretch. Hold for about 6 seconds. 4. Repeat 2 to 4 times. Shoulder blade squeeze   1. Sit or stand up tall with your arms at your sides. 2. Keep your shoulders relaxed and down, not shrugged. 3. Squeeze your shoulder blades together. Hold for 6 seconds, then relax. 4. Repeat 8 to 12 times. Straight-arm shoulder blade squeeze   1. Sit or stand tall. Relax your shoulders. 2. With palms down, hold your elastic tubing or band straight out in front of you. 3. Start with slight tension in the tubing or band, with your hands about shoulder-width apart. 4. Slowly pull straight out to the sides, squeezing your shoulder blades together. Keep your arms straight and at shoulder height. Slowly release. 5. Repeat 8 to 12 times. Rowing   1.  [place] your elastic tubing or band at about waist height. Take one end in each hand. 2. Sit or stand with your feet hip-width apart. 3. Hold your arms straight in front of you. Adjust your distance to create slight tension in the tubing or band. 4. Slightly tuck your chin. Relax your shoulders. 5. Without shrugging your shoulders, pull straight back. Your elbows will pass alongside your waist.    Pull-downs   1. [place] your elastic tubing or band in the top of a closed door. Take one end in each hand. 2. Either sit or stand, depending on what is more comfortable. If you feel unsteady, sit on a chair. 3. Start with your arms up and comfortably apart, elbows straight. There should be a slight tension in the tubing or band. 4. Slightly tuck your chin, and look straight ahead. 5. Keeping your back straight, slowly pull down and back, bending your elbows. 6. Stop where your hands are level with your chin, in a \"goalpost\" position. 7. Repeat 8 to 12 times. Chest T stretch   1. Lie on your back. Raise your knees so they are bent. Plant your feet on the floor, hip-width apart. 2. Tuck your chin, and relax your shoulders. 3. Reach your arms straight out to the sides. If you don't feel a mild stretch in your shoulders and across your chest, use a foam roll or a tightly rolled blanket under your spine, from your tailbone to your head. 4. Relax in this position for at least 15 to 30 seconds while you breathe normally. Repeat 2 to 4 times. 5. As you get used to this stretch, keep adding a little more time until you are able relax in this position for 2 or 3 minutes. When you can relax for at least 2 minutes, you only need to do the exercise 1 time per session. Chest goalpost stretch   1. Lie on your back. Raise your knees so they are bent. Plant your feet on the floor, hip-width apart. 2. Tuck your chin, and relax your shoulders. 3. Reach your arms straight out to the sides.   4. Bend your arms at the elbows, with your hands pointed toward the top of your head. Your arms should make an L on either side of your head. Your palms should be facing up. 5. If you don't feel a mild stretch in your shoulders and across your chest, use a foam roll or tightly rolled blanket under your spine, from your tailbone to your head. 6. Relax in this position for at least 15 to 30 seconds while you breathe normally. Repeat 2 to 4 times. 7. Each day you do this exercise, add a little more time until you can relax in this position for 2 or 3 minutes. When you can relax for at least 2 minutes, you only need to do the exercise 1 time per session. Follow-up care is a key part of your treatment and safety. Be sure to make and go to all appointments, and call your doctor if you are having problems. It's also a good idea to know your test results and keep a list of the medicines you take. Where can you learn more? Go to https://[employer]DeepDyve. org and sign in to your [employer] account. Enter [phone] in the momondo box to learn more about \"Shoulder Blade: Exercises. \"     If you do not have an account, please click on the \"Sign Up Now\" link. Current as of: November 16, 2020               Content Version: 12.9  © 2006-2021 [employer]wise[employer] Incorporated. Care instructions adapted under license by [facility] ([facility]). If you have questions about a medical condition or this instruction, always ask your healthcare professional. [place] [address] any warranty or liability for your use of this information.

## 2021-06-21 NOTE — PROGRESS NOTES
Rebecca Jasso D.O. [facility]   [address] [place]  Phone: [phone]  Fax: [phone]        21    Chief Complaint   Patient presents with    Neck Pain     workers comp follow up       HPI:  Angelito Brooks is a 49y.o. year old woman seen today in follow up regarding neck pain. Interval history: Since the last visit the patient returned to work about one week ago at light duty. She has not started back into acupuncture yet but she has C9 approval. Today, the pain is rated Pain Score:   5 where 0 is no pain and 10 is pain as bad as it can be. The pain is located in the neck,  radiates distally to the left shoulder, and is described as aching, swollen, tight. This pain occurs all day. The symptoms have been unchanged since onset. Symptoms are exacerbated by turning head. Factors which relieve the pain include rest. Other associated symptoms include swelling, paresthesias. Otherwise, the pain assessment has not changed since the last visit.      Past Medical History:   Diagnosis Date    Anesthesia     shivers bad when wakes up    Gastritis     Headache     History of muscle pain     IBS (irritable bowel syndrome)     Osteoarthritis     Osteoporosis        Past Surgical History:   Procedure Laterality Date    CHOLECYSTECTOMY      FOOT SURGERY      HIP SURGERY  2019    Left Hip Replacement    HYSTERECTOMY      HYSTERECTOMY ABDOMINAL N/A 2019    LAPAROSCOPY ROBOT XI ASSISTED REMOVAL OF CERVICAL STUMP WITH BSO CYSTOSCOPY performed by Husam Hernandez MD at [facility] Right     torn meniscus    OTHER SURGICAL HISTORY Right 14    second metatarsal cuneformjoint fusion with bone marrow aspirate    TUMOR REMOVAL      back       Social History     Tobacco Use    Smoking status: Former Smoker     Packs/day: 0.25     Types: Cigarettes     Quit date: 10/11/2014     Years since quittin.6    Smokeless tobacco: Never Used   Vaping Use    Vaping Use: Never used   Substance Use Topics    Alcohol use: Yes     Comment: occas    Drug use: Yes     Types: Marijuana       Family History   Problem Relation Age of Onset    Cancer Mother     Cancer Father        Current Outpatient Medications   Medication Sig Dispense Refill    diclofenac sodium (VOLTAREN) 1 % GEL APPLY TWICE DAILY      DULERA 200-5 MCG/ACT inhaler       Omega-3 Fatty Acids (FISH OIL) 1000 MG CAPS Take 3,000 mg by mouth 3 times daily      Cholecalciferol (VITAMIN D) 50 MCG (2000 UT) CAPS capsule Take by mouth daily      calcium carbonate (OSCAL) 500 MG TABS tablet Take 500 mg by mouth daily 2 tablets      lidocaine (LIDODERM) 5 % APPLY 1 PATCH DAILY FOR AT LEAST 12 HOURS TO PAINFUL AREA      medical marijuana Take by mouth as needed.  Probiotic Product (PROBIOTIC PO) Take by mouth      Multiple Vitamins-Minerals (THERAPEUTIC MULTIVITAMIN-MINERALS) tablet Take 1 tablet by mouth daily      aspirin 81 MG tablet Take 81 mg by mouth daily      ibuprofen (ADVIL;MOTRIN) 800 MG tablet Take 1 tablet by mouth every 8 hours as needed for Pain 30 tablet 0    albuterol (VOSPIRE ER) 4 MG extended release tablet Take 4 mg by mouth every 12 hours (Patient not taking: Reported on 6/21/2021)      cefdinir (OMNICEF) 300 MG capsule Take 300 mg by mouth 2 times daily (Patient not taking: Reported on 6/21/2021)      ondansetron (ZOFRAN ODT) 4 MG disintegrating tablet Take 1 tablet by mouth every 8 hours as needed for Nausea or Vomiting (Patient not taking: Reported on 3/16/2021) 10 tablet 0     No current facility-administered medications for this visit. Allergies   Allergen Reactions    Oxycodone Swelling    Percocet [Oxycodone-Acetaminophen] Swelling       Review of Systems:  No new weakness, paresthesia, incontinence of bowel or bladder, saddle anesthesia, falls or gait dysfunction. Otherwise, per HPI.      Physical Exam:   Blood pressure 116/77, pulse 81, height 5' 7\" (1.702 m), weight 195 lb (88.5 kg). GENERAL: The patient is in no apparent distress. Body habitus is obese. HEENT: No rhinorrhea, sneezing, yawning, or lacrimation. No scleral icterus or conjunctival injection. SKIN: No piloerection. No tract marks. No rash. PSYCH: Mood and affect are appropriate. Hygiene is appropriate. CARDIOVASCULAR  Heart is regular rate and rhythm. There is no edema. RESPIRATORY: Respirations are regular and unlabored. There is no cyanosis. GASTROINTESTINAL: Soft abdomen, non-tender. MSK: Cervical: Cervical lordosis increased,  thoracic kyphosis normal and lumbar lordosis normal. No superficial or bony tenderness. + tenderness to palpation at bilateral cervical paraspinals, bilateral upper trapezius,   sternocleidomastoid,  medial scapular muscles,  Scalenes with spasm left greater than right. No tenderness of occipital notch,No trigger points. No edema, erythema, ecchymosis, effusion, instability, mass or deformity. No midline bony tenderness. Spurling is positive left. Cervical AROM in flexion is 60 degrees, in extension is 20 degrees, in left rotation is 60degrees, in right rotation is 70  degrees, in left lateral flexion is 40 degrees and in right lateral flexion is 30 degrees. There is no crepitus. bilateral Shoulder: No edema, erythema, ecchymosis, effusion, instability, mass or deformity. Full painless ROM bilateral shoulders. No painful arc. No crepitus. No tenderness to palpation at the acromioclavicular joint, subacromial space or biceps tendon insertion. Negative Randy-Wall, Scarf,  Drop arm, and Speeds. Neurologic: Awake, alert and oriented in three planes. Speech is fluent. No facial weakness. Tongue is midline. Hearing is intact for conversation. Pupils are equal and round. Extraocular muscles are intact. Hearing is intact for conversation. Shoulder shrug symmetric.  Sensation: Intact for light touch and pin prick in all upper and lower extremity dermatomes. Vibration and proprioception are intact at the bilateral first MTP. Strength: 5/5 in all myotomes in the upper and lower extremities. Muscle Tendon Reflexes: 2+ symmetric in the bilateral upper and lower extremities. Babinski is downgoing bilaterally. Arlean Last is negative bilaterally. Gait is Normal.   Romberg is negative. Heel and toe walk are normal.  Tandem walk is normal.  No clonus or spasticity. The patient was able to rise from a chair and squat without difficulty. There is no tremor. Impression:   1. Spondylosis, cervical    2. Cervical stenosis of spinal canal    3. Chronic neck pain    4. Neck sprain, sequela        Plan:  Start acupuncture and massage therapy as previously planned. Continue home exercise program.   Continue current medications  The patient was educated about the diagnosis, prognosis, indications, risks and benefits of treatment. An opportunity to ask questions was given to the patient and questions were answered. The patient agreed to proceed with the recommended treatment as described above. Return in about 2 months (around 8/21/2021). Thank you for allowing me to participate in the care of your patient. Tata Xavier D.O., P.T.   Board Certified Physical Medicine and Rehabilitation  Board Certified Electrodiagnostic Medicine

## 2021-08-23 ENCOUNTER — OFFICE VISIT (OUTPATIENT)
Dept: PHYSICAL MEDICINE AND REHAB | Age: 56
End: 2021-08-23
Payer: COMMERCIAL

## 2021-08-23 VITALS
HEART RATE: 78 BPM | BODY MASS INDEX: 28.56 KG/M2 | WEIGHT: 182 LBS | HEIGHT: 67 IN | SYSTOLIC BLOOD PRESSURE: 117 MMHG | DIASTOLIC BLOOD PRESSURE: 70 MMHG

## 2021-08-23 DIAGNOSIS — M54.2 CHRONIC NECK PAIN: ICD-10-CM

## 2021-08-23 DIAGNOSIS — M47.812 SPONDYLOSIS, CERVICAL: Primary | ICD-10-CM

## 2021-08-23 DIAGNOSIS — M62.838 SPASM OF MUSCLE: ICD-10-CM

## 2021-08-23 DIAGNOSIS — G89.29 CHRONIC NECK PAIN: ICD-10-CM

## 2021-08-23 DIAGNOSIS — M48.02 CERVICAL STENOSIS OF SPINAL CANAL: ICD-10-CM

## 2021-08-23 PROCEDURE — 99214 OFFICE O/P EST MOD 30 MIN: CPT | Performed by: PHYSICAL MEDICINE & REHABILITATION

## 2021-08-23 RX ORDER — METHION/INOS/CHOL BT/B COM/LIV 110MG-86MG
CAPSULE ORAL DAILY
COMMUNITY

## 2021-08-23 RX ORDER — HYDROCODONE BITARTRATE AND ACETAMINOPHEN 10; 325 MG/1; MG/1
TABLET ORAL
COMMUNITY
Start: 2021-08-17

## 2021-08-23 RX ORDER — CIPROFLOXACIN 500 MG/1
TABLET, FILM COATED ORAL
COMMUNITY
Start: 2021-08-17 | End: 2021-11-30 | Stop reason: ALTCHOICE

## 2021-08-23 RX ORDER — RIBOFLAVIN (VITAMIN B2) 100 MG
TABLET ORAL DAILY
COMMUNITY

## 2021-08-23 RX ORDER — B-COMPLEX WITH VITAMIN C
TABLET ORAL DAILY
COMMUNITY

## 2021-08-23 NOTE — PROGRESS NOTES
Carmela Beth D.O. [facility]  [address] [facility] [facility] [place]  Phone: [phone]  Fax: [phone]        8/23/21    Chief Complaint   Patient presents with    Neck Pain     2 month follow up       HPI:  Farida Escamilla is a 49y.o. year old woman seen today in follow up regarding neck pain. Interval history: Since the last visit the patient has continued with light duty at work. She is getting acupuncture and deep tissue massage both two times a week. She had side effects from Zanaflex and flexeril. Lidoderm is helping. Today, the pain is rated Pain Score:   6 where 0 is no pain and 10 is pain as bad as it can be. The pain is located in the neck,  radiates distally to the left shoulder, and is described as aching, swollen, tight. This pain occurs all day. The symptoms have been unchanged since onset. Symptoms are exacerbated by turning head. Factors which relieve the pain include rest. Other associated symptoms include swelling, paresthesias. Otherwise, the pain assessment has not changed since the last visit.      Past Medical History:   Diagnosis Date    Anesthesia     shivers bad when wakes up    Gastritis     Headache     History of muscle pain     IBS (irritable bowel syndrome) 2021    Osteoarthritis     Osteoporosis 2018       Past Surgical History:   Procedure Laterality Date    CHOLECYSTECTOMY      FOOT SURGERY      HIP SURGERY  09/09/2019    Left Hip Replacement    HYSTERECTOMY      HYSTERECTOMY ABDOMINAL N/A 2/14/2019    LAPAROSCOPY ROBOT XI ASSISTED REMOVAL OF CERVICAL STUMP WITH BSO CYSTOSCOPY performed by Jessi Peterson MD at [id number] [place] [address] Right     torn meniscus    OTHER SURGICAL HISTORY Right 03/07/14    second metatarsal cuneformjoint fusion with bone marrow aspirate    TUMOR REMOVAL      back       Social History     Tobacco Use    Smoking status: Former Smoker     Packs/day: 0.25     Types: Cigarettes in no apparent distress. Body habitus is obese. HEENT: No rhinorrhea, sneezing, yawning, or lacrimation. No scleral icterus or conjunctival injection. SKIN: No piloerection. No tract marks. No rash. PSYCH: Mood and affect are appropriate. Hygiene is appropriate. CARDIOVASCULAR  Heart is regular rate and rhythm. There is no edema. RESPIRATORY: Respirations are regular and unlabored. There is no cyanosis. GASTROINTESTINAL: Soft abdomen, non-tender. MSK: Cervical: Cervical lordosis increased,  thoracic kyphosis normal and lumbar lordosis normal. No superficial or bony tenderness. + tenderness to palpation at bilateral cervical paraspinals, bilateral upper trapezius,   sternocleidomastoid,  medial scapular muscles,  Scalenes with spasm left greater than right. No tenderness of occipital notch,No trigger points. No edema, erythema, ecchymosis, effusion, instability, mass or deformity. No midline bony tenderness. Spurling is positive left. Cervical AROM in flexion is 60 degrees, in extension is 20 degrees, in left rotation is 60degrees, in right rotation is 70  degrees, in left lateral flexion is 40 degrees and in right lateral flexion is 30 degrees. There is no crepitus. bilateral Shoulder: No edema, erythema, ecchymosis, effusion, instability, mass or deformity. Full painless ROM bilateral shoulders. No painful arc. No crepitus. No tenderness to palpation at the acromioclavicular joint, subacromial space or biceps tendon insertion. Negative Randy-Wall, Scarf,  Drop arm, and Speeds. Neurologic: Awake, alert and oriented in three planes. Speech is fluent. No facial weakness. Tongue is midline. Hearing is intact for conversation. Pupils are equal and round. Extraocular muscles are intact. Hearing is intact for conversation. Shoulder shrug symmetric. Sensation: Intact for light touch and pin prick in all upper and lower extremity dermatomes.  Vibration and proprioception are intact at the bilateral first MTP. Strength: 5/5 in all myotomes in the upper and lower extremities. Muscle Tendon Reflexes: 2+ symmetric in the bilateral upper and lower extremities. Babinski is downgoing bilaterally. Sukhdeep Saber is negative bilaterally. Gait is Normal.   Romberg is negative. Heel and toe walk are normal.  Tandem walk is normal.  No clonus or spasticity. The patient was able to rise from a chair and squat without difficulty. There is no tremor. Impression:   1. Spondylosis, cervical    2. Cervical stenosis of spinal canal    3. Chronic neck pain    4. Spasm of muscle        Plan:  Continue acupuncture and massage therapy. Continue home exercise program.   Continue current medications  Orders Placed This Encounter   Procedures    MD CERVICAL TRACTION EQUIPMENT     [facility] cervical traction unit. The patient was educated about the diagnosis, prognosis, indications, risks and benefits of treatment. An opportunity to ask questions was given to the patient and questions were answered. The patient agreed to proceed with the recommended treatment as described above. Return in about 3 months (around 11/23/2021). Thank you for allowing me to participate in the care of your patient. Omer Colon D.O., P.T.   Board Certified Physical Medicine and Rehabilitation  Board Certified Electrodiagnostic Medicine

## 2021-12-13 ENCOUNTER — OFFICE VISIT (OUTPATIENT)
Dept: PHYSICAL MEDICINE AND REHAB | Age: 56
End: 2021-12-13
Payer: COMMERCIAL

## 2021-12-13 VITALS
HEIGHT: 65 IN | DIASTOLIC BLOOD PRESSURE: 78 MMHG | SYSTOLIC BLOOD PRESSURE: 121 MMHG | WEIGHT: 169 LBS | BODY MASS INDEX: 28.16 KG/M2 | HEART RATE: 76 BPM

## 2021-12-13 DIAGNOSIS — G89.29 CHRONIC NECK PAIN: ICD-10-CM

## 2021-12-13 DIAGNOSIS — M48.02 CERVICAL STENOSIS OF SPINAL CANAL: ICD-10-CM

## 2021-12-13 DIAGNOSIS — M54.2 CHRONIC NECK PAIN: ICD-10-CM

## 2021-12-13 DIAGNOSIS — M54.2 TRIGGER POINT OF NECK: ICD-10-CM

## 2021-12-13 DIAGNOSIS — M47.812 SPONDYLOSIS, CERVICAL: ICD-10-CM

## 2021-12-13 DIAGNOSIS — S13.9XXS NECK SPRAIN, SEQUELA: Primary | ICD-10-CM

## 2021-12-13 PROCEDURE — 99214 OFFICE O/P EST MOD 30 MIN: CPT | Performed by: PHYSICAL MEDICINE & REHABILITATION

## 2021-12-13 RX ORDER — LIDOCAINE 50 MG/G
1 PATCH TOPICAL EVERY 12 HOURS
Qty: 30 PATCH | Refills: 0 | Status: SHIPPED
Start: 2021-12-13 | End: 2022-01-24 | Stop reason: SDUPTHER

## 2021-12-13 NOTE — PROGRESS NOTES
Jessica Hernandez D.O. [facility]  [address] [facility] [address]  Phone: [phone]  Fax: [phone]        12/13/21    Chief Complaint   Patient presents with    Neck Pain     workers comp follow up       HPI:  Dusty Good is a 49y.o. year old woman seen today in follow up regarding neck pain. Interval history: Since the last visit the received approval for acupuncture from [address] [place] [address] but has not started yet. She thinks the traction was denied. She is doing her home exercise program.  Today, the pain is rated Pain Score:   3 where 0 is no pain and 10 is pain as bad as it can be. The pain is located in the neck,  radiates distally to the left shoulder, and is described as aching, swollen, tight. This pain occurs all day. The symptoms have been unchanged since onset. Symptoms are exacerbated by turning head. Factors which relieve the pain include rest. Other associated symptoms include swelling, paresthesias. Otherwise, the pain assessment has not changed since the last visit.      Past Medical History:   Diagnosis Date    Anesthesia     shivers bad when wakes up    Gastritis     Headache     History of muscle pain     IBS (irritable bowel syndrome) 2021    Osteoarthritis     Osteoporosis 2018       Past Surgical History:   Procedure Laterality Date    CHOLECYSTECTOMY      FOOT SURGERY      HIP SURGERY  09/09/2019    Left Hip Replacement    HYSTERECTOMY      HYSTERECTOMY ABDOMINAL N/A 2/14/2019    LAPAROSCOPY ROBOT XI ASSISTED REMOVAL OF CERVICAL STUMP WITH BSO CYSTOSCOPY performed by Joie Delcid MD at [facility] Right     torn meniscus    OTHER SURGICAL HISTORY Right 03/07/14    second metatarsal cuneformjoint fusion with bone marrow aspirate    TUMOR REMOVAL      back       Social History     Tobacco Use    Smoking status: Former Smoker     Packs/day: 0.25     Types: Cigarettes     Quit date: 10/11/2014     Years since quittin.1    Smokeless tobacco: Never Used   Vaping Use    Vaping Use: Never used   Substance Use Topics    Alcohol use: Yes     Comment: occas    Drug use: Yes     Types: Marijuana Rhondastephania Carter)       Family History   Problem Relation Age of Onset    Cancer Mother     Cancer Father        Current Outpatient Medications   Medication Sig Dispense Refill    lidocaine (LIDODERM) 5 % Place 1 patch onto the skin every 12 hours 12 hours on, 12 hours off. 30 patch 0    Thiamine HCl (B-1) 100 MG TABS Take by mouth daily      Zinc 100 MG TABS Take by mouth daily      Ascorbic Acid (VITAMIN C) 100 MG tablet Take by mouth daily      HYDROcodone-acetaminophen (NORCO)  MG per tablet TAKE 1 TABLET BY MOUTH EVERY DAY AS NEEDED      diclofenac sodium (VOLTAREN) 1 % GEL APPLY TWICE DAILY      Omega-3 Fatty Acids (FISH OIL) 1000 MG CAPS Take 3,000 mg by mouth 3 times daily      Cholecalciferol (VITAMIN D) 50 MCG (2000 UT) CAPS capsule Take by mouth daily      calcium carbonate (OSCAL) 500 MG TABS tablet Take 500 mg by mouth daily 2 tablets      lidocaine (LIDODERM) 5 % APPLY 1 PATCH DAILY FOR AT LEAST 12 HOURS TO PAINFUL AREA      medical marijuana Take by mouth as needed.  Probiotic Product (PROBIOTIC PO) Take by mouth      Multiple Vitamins-Minerals (THERAPEUTIC MULTIVITAMIN-MINERALS) tablet Take 1 tablet by mouth daily      aspirin 81 MG tablet Take 81 mg by mouth daily      ibuprofen (ADVIL;MOTRIN) 800 MG tablet Take 1 tablet by mouth every 8 hours as needed for Pain 30 tablet 0     No current facility-administered medications for this visit. Allergies   Allergen Reactions    Percocet [Oxycodone-Acetaminophen] Swelling       Review of Systems:  No new weakness, paresthesia, incontinence of bowel or bladder, saddle anesthesia, falls or gait dysfunction. Otherwise, per HPI. Physical Exam:   Blood pressure 121/78, pulse 76, height 5' 5\" (1.651 m), weight 169 lb (76.7 kg).   GENERAL: The patient is in no apparent distress. Body habitus is obese. MSK: Cervical: Cervical lordosis increased,  thoracic kyphosis normal and lumbar lordosis normal. No superficial or bony tenderness. + tenderness to palpation at bilateral cervical paraspinals, bilateral upper trapezius,   sternocleidomastoid,  medial scapular muscles,  Scalenes with spasm left greater than left. Trigger point present left trapezius and SCM. No tenderness of occipital notch. No edema, erythema, ecchymosis, effusion, instability, mass or deformity. No midline bony tenderness. Spurling is positive left. Cervical AROM in flexion is 60 degrees, in extension is 20 degrees, in left rotation is 60degrees, in right rotation is 70  degrees, in left lateral flexion is 40 degrees and in right lateral flexion is 30 degrees. There is no crepitus. bilateral Shoulder: No edema, erythema, ecchymosis, effusion, instability, mass or deformity. Full painless ROM bilateral shoulders. No painful arc. No crepitus. No tenderness to palpation at the acromioclavicular joint, subacromial space or biceps tendon insertion. Negative Randy-Wall, Scarf,  Drop arm, and Speeds. Neurologic: Awake, alert and oriented in three planes. Speech is fluent. No facial weakness. Tongue is midline. Hearing is intact for conversation. Pupils are equal and round. Extraocular muscles are intact. Hearing is intact for conversation. Shoulder shrug symmetric. Sensation: Intact for light touch and pin prick in all upper and lower extremity dermatomes. Vibration and proprioception are intact at the bilateral first MTP. Strength: 5/5 in all myotomes in the upper and lower extremities. Muscle Tendon Reflexes: 2+ symmetric in the bilateral upper and lower extremities. Babinski is downgoing bilaterally. Ofelia Doug is negative bilaterally. Gait is Normal.   Romberg is negative. Heel and toe walk are normal.  Tandem walk is normal.  No clonus or spasticity.   The patient was able to rise from a chair and squat without difficulty. There is no tremor. Impression:   1. Neck sprain, sequela    2. Spondylosis, cervical    3. Cervical stenosis of spinal canal    4. Chronic neck pain    5. Trigger point of neck        Plan:  Start approved acupuncture. Continue home exercise program.   Continue current medications  C9 approval for trigger point injections. The patient was educated about the diagnosis, prognosis, indications, risks and benefits of treatment. An opportunity to ask questions was given to the patient and questions were answered. The patient agreed to proceed with the recommended treatment as described above. Return in about 3 months (around 3/13/2022). Thank you for allowing me to participate in the care of your patient. Zara Shukla D.O., P.T.   Board Certified Physical Medicine and Rehabilitation  Board Certified Electrodiagnostic Medicine

## 2021-12-15 ENCOUNTER — TELEPHONE (OUTPATIENT)
Dept: PHYSICAL MEDICINE AND REHAB | Age: 56
End: 2021-12-15

## 2021-12-15 NOTE — TELEPHONE ENCOUNTER
Called and left message on patient voicemail that I was calling to schedule her trigger point injection and that it would have to go under her medical insurance since it was denied under workers comp. Will wait for return call from patient.

## 2021-12-22 ENCOUNTER — OFFICE VISIT (OUTPATIENT)
Dept: PHYSICAL MEDICINE AND REHAB | Age: 56
End: 2021-12-22
Payer: COMMERCIAL

## 2021-12-22 VITALS
WEIGHT: 171 LBS | BODY MASS INDEX: 26.84 KG/M2 | HEART RATE: 80 BPM | TEMPERATURE: 97.3 F | DIASTOLIC BLOOD PRESSURE: 82 MMHG | SYSTOLIC BLOOD PRESSURE: 118 MMHG | HEIGHT: 67 IN

## 2021-12-22 DIAGNOSIS — M54.2 TRIGGER POINT OF NECK: Primary | ICD-10-CM

## 2021-12-22 PROCEDURE — 20553 NJX 1/MLT TRIGGER POINTS 3/>: CPT | Performed by: PHYSICAL MEDICINE & REHABILITATION

## 2021-12-22 RX ORDER — LIDOCAINE HYDROCHLORIDE 10 MG/ML
6 INJECTION, SOLUTION INFILTRATION; PERINEURAL ONCE
Status: COMPLETED | OUTPATIENT
Start: 2021-12-22 | End: 2021-12-22

## 2021-12-22 RX ADMIN — LIDOCAINE HYDROCHLORIDE 6 ML: 10 INJECTION, SOLUTION INFILTRATION; PERINEURAL at 13:38

## 2021-12-22 NOTE — PROGRESS NOTES
Sonny Nuñez D.O. [facility]  [address] [facility]  Phone: [phone]  Fax: [phone]    12/22/2021    Chief Complaint   Patient presents with    Neck Pain     Trigger point injections       Last injection: n/a  Taking anticoagulants/antiplatelets: No  Diabetic: No  Febrile/active infection: No    After explaining the indications, risks, benefits and alternatives of a Left trapezius, SCM and rhomboid trigger point, the patient agreed to proceed. A permit was signed and scanned into the chart. The patient was placed in the  seated position. The skin over the trigger point was prepared with alcohol. Using aseptic no touch technique, a 22 gauge, 1 1/2\" needle with 6 cc of Xylocaine 1% was directed into the trigger point. After negative aspiration, 2 cc of the medication was injected in a fanning out method. Adequate hemostasis was achieved and a bandage applied. The patient tolerated the procedure well and was educated in post injection care. The patient was clinically monitored after the injection and left the office without incident. There was post-injection reduction in pain and improved range of motion. Sonny Nuñez D.O., P.T.   Board Certified Physical Medicine and Rehabilitation  Board Certified Electrodiagnostic Medicine    Administrations This Visit     lidocaine 1 % injection 6 mL     Admin Date  12/22/2021  13:38 Action  Given by Other Dose  6 mL Route  Other Site   Administered By  Wilbert Dominguez MA    Ordering Provider: Cl La DO    NDC: 9686-5023-45    Lot#: [id number]    : Machelle Walsh    Patient Supplied?: No

## 2022-01-24 ENCOUNTER — TELEPHONE (OUTPATIENT)
Dept: PHYSICAL MEDICINE AND REHAB | Age: 57
End: 2022-01-24

## 2022-01-24 ENCOUNTER — OFFICE VISIT (OUTPATIENT)
Dept: PHYSICAL MEDICINE AND REHAB | Age: 57
End: 2022-01-24
Payer: COMMERCIAL

## 2022-01-24 VITALS
SYSTOLIC BLOOD PRESSURE: 133 MMHG | HEIGHT: 67 IN | BODY MASS INDEX: 26.53 KG/M2 | HEART RATE: 89 BPM | WEIGHT: 169 LBS | DIASTOLIC BLOOD PRESSURE: 96 MMHG

## 2022-01-24 DIAGNOSIS — M48.02 CERVICAL STENOSIS OF SPINAL CANAL: ICD-10-CM

## 2022-01-24 DIAGNOSIS — M54.2 TRIGGER POINT OF NECK: Primary | ICD-10-CM

## 2022-01-24 DIAGNOSIS — M47.812 SPONDYLOSIS, CERVICAL: ICD-10-CM

## 2022-01-24 PROCEDURE — 99214 OFFICE O/P EST MOD 30 MIN: CPT | Performed by: PHYSICAL MEDICINE & REHABILITATION

## 2022-01-24 RX ORDER — CHLORZOXAZONE 500 MG/1
500 TABLET ORAL 4 TIMES DAILY PRN
Qty: 120 TABLET | Refills: 2 | Status: SHIPPED | OUTPATIENT
Start: 2022-01-24 | End: 2022-02-23

## 2022-01-24 NOTE — TELEPHONE ENCOUNTER
Called and left message on patient voicemail that the lidocaine patch was not approved through workers comp and will have to go under his private insurance.

## 2022-01-24 NOTE — PROGRESS NOTES
Rosalinda Odonnell D.O. [facility]  [address] [facility] [place]  Phone: [phone]  Fax: [phone]        1/24/22    Chief Complaint   Patient presents with    Neck Pain     1 month follow up workers comp    Shoulder Pain       HPI:  Katelynn Mandel is a 49y.o. year old woman seen today in follow up regarding neck pain. Interval history: Since the last visit, the patient was doing well until about a week ago. Up until then, she had excellent results from the trigger point injections on 12/22/21. [facility] has denied repeat trigger points. Patient wants to proceed under her medical insurance but wants an estimate for her out of pocket first.  Today, the pain is rated Pain Score:   5 where 0 is no pain and 10 is pain as bad as it can be. The pain is located in the neck,  radiates distally to the left shoulder, and is described as aching, swollen, tight. This pain occurs all day. The symptoms have been unchanged since onset. Symptoms are exacerbated by turning head. Factors which relieve the pain include rest. Other associated symptoms include swelling, paresthesias. Otherwise, the pain assessment has not changed since the last visit.      Past Medical History:   Diagnosis Date    Anesthesia     shivers bad when wakes up    Gastritis     Headache     History of muscle pain     IBS (irritable bowel syndrome) 2021    Osteoarthritis     Osteoporosis 2018       Past Surgical History:   Procedure Laterality Date    CHOLECYSTECTOMY      FOOT SURGERY      HIP SURGERY  09/09/2019    Left Hip Replacement    HYSTERECTOMY      HYSTERECTOMY ABDOMINAL N/A 2/14/2019    LAPAROSCOPY ROBOT XI ASSISTED REMOVAL OF CERVICAL STUMP WITH BSO CYSTOSCOPY performed by Lord Doug MD at [facility] Right     torn meniscus    OTHER SURGICAL HISTORY Right 03/07/14    second metatarsal cuneformjoint fusion with bone marrow aspirate    TUMOR REMOVAL back       Social History     Tobacco Use    Smoking status: Former Smoker     Packs/day: 0.25     Types: Cigarettes     Quit date: 10/11/2014     Years since quittin.2    Smokeless tobacco: Never Used   Vaping Use    Vaping Use: Never used   Substance Use Topics    Alcohol use: Yes     Comment: occas    Drug use: Yes     Types: Marijuana Augustin Don)       Family History   Problem Relation Age of Onset    Cancer Mother     Cancer Father        Current Outpatient Medications   Medication Sig Dispense Refill    chlorzoxazone (PARAFON FORTE) 500 MG tablet Take 1 tablet by mouth 4 times daily as needed for Muscle spasms 120 tablet 2    Thiamine HCl (B-1) 100 MG TABS Take by mouth daily      Zinc 100 MG TABS Take by mouth daily      Ascorbic Acid (VITAMIN C) 100 MG tablet Take by mouth daily      HYDROcodone-acetaminophen (NORCO)  MG per tablet TAKE 1 TABLET BY MOUTH EVERY DAY AS NEEDED      diclofenac sodium (VOLTAREN) 1 % GEL APPLY TWICE DAILY      Omega-3 Fatty Acids (FISH OIL) 1000 MG CAPS Take 3,000 mg by mouth 3 times daily      Cholecalciferol (VITAMIN D) 50 MCG (2000 UT) CAPS capsule Take by mouth daily      calcium carbonate (OSCAL) 500 MG TABS tablet Take 500 mg by mouth daily 2 tablets      lidocaine (LIDODERM) 5 % APPLY 1 PATCH DAILY FOR AT LEAST 12 HOURS TO PAINFUL AREA      medical marijuana Take by mouth as needed.  Probiotic Product (PROBIOTIC PO) Take by mouth      Multiple Vitamins-Minerals (THERAPEUTIC MULTIVITAMIN-MINERALS) tablet Take 1 tablet by mouth daily      aspirin 81 MG tablet Take 81 mg by mouth daily      ibuprofen (ADVIL;MOTRIN) 800 MG tablet Take 1 tablet by mouth every 8 hours as needed for Pain 30 tablet 0     No current facility-administered medications for this visit.        Allergies   Allergen Reactions    Percocet [Oxycodone-Acetaminophen] Swelling       Review of Systems:  No new weakness, paresthesia, incontinence of bowel or bladder, saddle anesthesia, falls or gait dysfunction. Otherwise, per HPI. Physical Exam:   Blood pressure (!) 133/96, pulse 89, height 5' 7\" (1.702 m), weight 169 lb (76.7 kg). GENERAL: The patient is in no apparent distress. Body habitus is obese. MSK: Cervical: Cervical lordosis increased,  thoracic kyphosis normal and lumbar lordosis normal. No superficial or bony tenderness. + tenderness to palpation at bilateral cervical paraspinals, bilateral upper trapezius,   sternocleidomastoid,  medial scapular muscles,  Scalenes with spasm left greater than left. Trigger point present left trapezius and SCM. No tenderness of occipital notch. No edema, erythema, ecchymosis, effusion, instability, mass or deformity. No midline bony tenderness. Spurling is positive left. Cervical AROM in flexion is 60 degrees, in extension is 20 degrees, in left rotation is 60degrees, in right rotation is 70  degrees, in left lateral flexion is 40 degrees and in right lateral flexion is 30 degrees. There is no crepitus. bilateral Shoulder: No edema, erythema, ecchymosis, effusion, instability, mass or deformity. Full painless ROM bilateral shoulders. No painful arc. No crepitus. No tenderness to palpation at the acromioclavicular joint, subacromial space or biceps tendon insertion. Negative Randy-Wall, Scarf,  Drop arm, and Speeds. Neurologic: Awake, alert and oriented in three planes. Speech is fluent. No facial weakness. Tongue is midline. Hearing is intact for conversation. Pupils are equal and round. Extraocular muscles are intact. Hearing is intact for conversation. Shoulder shrug symmetric. Sensation: Intact for light touch and pin prick in all upper and lower extremity dermatomes. Vibration and proprioception are intact at the bilateral first MTP. Strength: 5/5 in all myotomes in the upper and lower extremities. Muscle Tendon Reflexes: 2+ symmetric in the bilateral upper and lower extremities.  Babinski is downgoing bilaterally. Carvel Better is negative bilaterally. Gait is Normal.   Romberg is negative. Heel and toe walk are normal.  Tandem walk is normal.  No clonus or spasticity. The patient was able to rise from a chair and squat without difficulty. There is no tremor. Impression:   1. Trigger point of neck    2. Spondylosis, cervical    3. Cervical stenosis of spinal canal        Plan:  Massage therapy. Obtain estimate for patient for trigger point injections. Continue home exercise program.   Orders Placed This Encounter   Medications    chlorzoxazone (PARAFON FORTE) 500 MG tablet     Sig: Take 1 tablet by mouth 4 times daily as needed for Muscle spasms     Dispense:  120 tablet     Refill:  2     No orders of the defined types were placed in this encounter. The patient was educated about the diagnosis, prognosis, indications, risks and benefits of treatment. An opportunity to ask questions was given to the patient and questions were answered. The patient agreed to proceed with the recommended treatment as described above. Return for injection once authorized. Thank you for allowing me to participate in the care of your patient. Will Fang D.O., P.T.   Board Certified Physical Medicine and Rehabilitation  Board Certified Electrodiagnostic Medicine

## 2022-01-25 ENCOUNTER — TELEPHONE (OUTPATIENT)
Dept: PHYSICAL MEDICINE AND REHAB | Age: 57
End: 2022-01-25

## 2022-01-25 NOTE — TELEPHONE ENCOUNTER
Called and left message on patient voicemail that I have the quote for her for trigger point injections. Will wait for return call from patient.

## 2022-01-25 NOTE — TELEPHONE ENCOUNTER
Patient called back and informed her of the quote for Trigger point injections. Quoted her $61 for 1-2 muscles and $69 for 3 or more muscles. Patient stated that she wants to schedule.

## 2022-02-07 ENCOUNTER — TELEPHONE (OUTPATIENT)
Dept: ADMINISTRATIVE | Age: 57
End: 2022-02-07

## 2022-02-07 NOTE — TELEPHONE ENCOUNTER
Patient cx 2/9 injection. Stated [facility] started denying her appointments. She was advised by  to not pay any appointments out of pocket. Patient wants to still see Dr. Casimiro Buckner but wants to wait until everything is cleared up.

## 2022-02-10 ENCOUNTER — TELEPHONE (OUTPATIENT)
Dept: PHYSICAL MEDICINE AND REHAB | Age: 57
End: 2022-02-10

## 2022-02-10 NOTE — TELEPHONE ENCOUNTER
Patient called in and left a message stating that orders were supposed to be faxed to a new massotherapist but she has not heard anything. Nothing seen in her chart. Patient would like a call back, she is workers comp.

## 2022-03-14 ENCOUNTER — OFFICE VISIT (OUTPATIENT)
Dept: PHYSICAL MEDICINE AND REHAB | Age: 57
End: 2022-03-14
Payer: COMMERCIAL

## 2022-03-14 VITALS
DIASTOLIC BLOOD PRESSURE: 80 MMHG | HEART RATE: 80 BPM | SYSTOLIC BLOOD PRESSURE: 124 MMHG | HEIGHT: 67 IN | WEIGHT: 174 LBS | BODY MASS INDEX: 27.31 KG/M2

## 2022-03-14 DIAGNOSIS — M54.2 TRIGGER POINT OF NECK: ICD-10-CM

## 2022-03-14 DIAGNOSIS — M48.02 CERVICAL STENOSIS OF SPINAL CANAL: ICD-10-CM

## 2022-03-14 DIAGNOSIS — S13.9XXS NECK SPRAIN, SEQUELA: Primary | ICD-10-CM

## 2022-03-14 DIAGNOSIS — M75.22 BICEPS TENDONITIS ON LEFT: ICD-10-CM

## 2022-03-14 DIAGNOSIS — M47.812 SPONDYLOSIS, CERVICAL: ICD-10-CM

## 2022-03-14 PROCEDURE — 99214 OFFICE O/P EST MOD 30 MIN: CPT | Performed by: PHYSICAL MEDICINE & REHABILITATION

## 2022-03-14 RX ORDER — CHLORZOXAZONE 500 MG/1
TABLET ORAL
COMMUNITY
Start: 2022-02-23

## 2022-03-14 RX ORDER — AMITRIPTYLINE HYDROCHLORIDE 25 MG/1
TABLET, FILM COATED ORAL
COMMUNITY
Start: 2022-02-24

## 2022-03-14 NOTE — PROGRESS NOTES
IM not so sure it was  that was covering the medication. I do not see any approvals for medication in her chart,maybe her private insurance was the one paying for it.   Ebony

## 2022-03-14 NOTE — PROGRESS NOTES
Claim is self insured and they are saying that the employer is responsible for the payment. We have not requested any diagnosis,she is only allowed in her claim  S16. 1XXA STRAIN OF MUSCLE, FASCIA AND TENDON AT NECK LEVEL.   Ebony

## 2022-03-14 NOTE — Clinical Note
Can you see if there is anything related to her left shoulder in the claim. Her  is asking if we need additional allowances to get her treatment approved. I think we already requested additional allowance of trigger point. Can you verify for me? Also she said lidoderm was denied. The paper scanned in says it was denied but under the spot they would normally put the reason they just put the medication name. Can you find out why they denied it? They have been covering it for over a year I think and it was helping her.

## 2022-03-14 NOTE — PROGRESS NOTES
Satinder Bragg D.O. [facility]  [address]  Phone: [phone]  Fax: [phone]        3/14/22    Chief Complaint   Patient presents with    Neck Pain     3 month workers comp follow up    Shoulder Pain     Left       HPI:  Mary Ann Walter is a 49y.o. year old woman seen today in follow up regarding neck pain. Interval history: Since the last visit the patient still has not received [facility] approval for trigger point injections and the lidoderm was also denied. Today, the pain is rated Pain Score:   5 where 0 is no pain and 10 is pain as bad as it can be. The pain is located in the neck,  radiates distally to the left shoulder, and is described as aching, swollen, tight. This pain occurs all day. The symptoms have been unchanged since onset. Symptoms are exacerbated by turning head. Factors which relieve the pain include rest. Other associated symptoms include swelling, paresthesias. Otherwise, the pain assessment has not changed since the last visit.      Past Medical History:   Diagnosis Date    Anesthesia     shivers bad when wakes up    Gastritis     Headache     History of muscle pain     IBS (irritable bowel syndrome) 2021    Osteoarthritis     Osteoporosis 2018       Past Surgical History:   Procedure Laterality Date    CHOLECYSTECTOMY      FOOT SURGERY      HIP SURGERY  09/09/2019    Left Hip Replacement    HYSTERECTOMY      HYSTERECTOMY ABDOMINAL N/A 2/14/2019    LAPAROSCOPY ROBOT XI ASSISTED REMOVAL OF CERVICAL STUMP WITH BSO CYSTOSCOPY performed by Gio Chew MD at [facility] Right     torn meniscus    OTHER SURGICAL HISTORY Right 03/07/14    second metatarsal cuneformjoint fusion with bone marrow aspirate    TUMOR REMOVAL      back       Social History     Tobacco Use    Smoking status: Former Smoker     Packs/day: 0.25     Types: Cigarettes     Quit date: 10/11/2014     Years since quittin.4    Smokeless tobacco: Never Used   Vaping Use    Vaping Use: Never used   Substance Use Topics    Alcohol use: Yes     Comment: occas    Drug use: Yes     Types: Marijuana Thurl Cipro)       Family History   Problem Relation Age of Onset    Cancer Mother     Cancer Father        Current Outpatient Medications   Medication Sig Dispense Refill    amitriptyline (ELAVIL) 25 MG tablet TAKE 1 TABLET BY MOUTH AT BEDTIME      chlorzoxazone (PARAFON FORTE) 500 MG tablet TAKE 1 TABLET BY MOUTH 4 TIMES DAILY AS NEEDED FOR MUSCLE SPASMS.  Thiamine HCl (B-1) 100 MG TABS Take by mouth daily      Zinc 100 MG TABS Take by mouth daily      Ascorbic Acid (VITAMIN C) 100 MG tablet Take by mouth daily      HYDROcodone-acetaminophen (NORCO)  MG per tablet TAKE 1 TABLET BY MOUTH EVERY DAY AS NEEDED      diclofenac sodium (VOLTAREN) 1 % GEL APPLY TWICE DAILY      Omega-3 Fatty Acids (FISH OIL) 1000 MG CAPS Take 3,000 mg by mouth 3 times daily      Cholecalciferol (VITAMIN D) 50 MCG (2000 UT) CAPS capsule Take by mouth daily      calcium carbonate (OSCAL) 500 MG TABS tablet Take 500 mg by mouth daily 2 tablets      lidocaine (LIDODERM) 5 % APPLY 1 PATCH DAILY FOR AT LEAST 12 HOURS TO PAINFUL AREA      medical marijuana Take by mouth as needed.  Probiotic Product (PROBIOTIC PO) Take by mouth      Multiple Vitamins-Minerals (THERAPEUTIC MULTIVITAMIN-MINERALS) tablet Take 1 tablet by mouth daily      aspirin 81 MG tablet Take 81 mg by mouth daily      ibuprofen (ADVIL;MOTRIN) 800 MG tablet Take 1 tablet by mouth every 8 hours as needed for Pain 30 tablet 0     No current facility-administered medications for this visit. Allergies   Allergen Reactions    Percocet [Oxycodone-Acetaminophen] Swelling       Review of Systems:  No new weakness, paresthesia, incontinence of bowel or bladder, saddle anesthesia, falls or gait dysfunction. Otherwise, per HPI.      Physical Exam:   Blood pressure 124/80, pulse 80, height 5' 7\" (1.702 m), weight 174 lb (78.9 kg). GENERAL: The patient is in no apparent distress. Body habitus is obese. MSK: Cervical: Cervical lordosis increased,  thoracic kyphosis normal and lumbar lordosis normal. No superficial or bony tenderness. + tenderness to palpation at bilateral cervical paraspinals, bilateral upper trapezius,   sternocleidomastoid,  medial scapular muscles,  Scalenes with spasm left greater than left. Trigger point present left trapezius and SCM. No tenderness of occipital notch. No edema, erythema, ecchymosis, effusion, instability, mass or deformity. No midline bony tenderness. Spurling is positive left. Cervical AROM in flexion is 60 degrees, in extension is 20 degrees, in left rotation is 60degrees, in right rotation is 70  degrees, in left lateral flexion is 40 degrees and in right lateral flexion is 30 degrees. There is no crepitus. bilateral Shoulder: No edema, erythema, ecchymosis, effusion, instability, mass or deformity. Full painless ROM bilateral shoulders. Pain at end range rotation. No painful arc. No crepitus. No tenderness to palpation at the acromioclavicular joint, subacromial space. Biceps tendon is tender proximally in the groove. +left Randy-Wall, Scarf. Negative Drop arm, and Speeds. Neurologic: Awake, alert and oriented in three planes. Speech is fluent. No facial weakness. Tongue is midline. Hearing is intact for conversation. Pupils are equal and round. Extraocular muscles are intact. Hearing is intact for conversation. Shoulder shrug symmetric. Sensation: Intact for light touch and pin prick in all upper and lower extremity dermatomes. Vibration and proprioception are intact at the bilateral first MTP. Strength: 5/5 in all myotomes in the upper and lower extremities. Muscle Tendon Reflexes: 2+ symmetric in the bilateral upper and lower extremities. Babinski is downgoing bilaterally. Figueredo Amharic is negative bilaterally.  Gait is

## 2023-01-18 ENCOUNTER — APPOINTMENT (OUTPATIENT)
Dept: GENERAL RADIOLOGY | Age: 58
End: 2023-01-18
Payer: COMMERCIAL

## 2023-01-18 ENCOUNTER — HOSPITAL ENCOUNTER (EMERGENCY)
Age: 58
Discharge: HOME | End: 2023-01-18
Payer: COMMERCIAL

## 2023-01-18 VITALS
TEMPERATURE: 98.2 F | RESPIRATION RATE: 18 BRPM | WEIGHT: 179 LBS | HEART RATE: 77 BPM | BODY MASS INDEX: 28.04 KG/M2 | SYSTOLIC BLOOD PRESSURE: 133 MMHG | DIASTOLIC BLOOD PRESSURE: 80 MMHG | OXYGEN SATURATION: 100 %

## 2023-01-18 DIAGNOSIS — S46.211A STRAIN OF RIGHT BICEPS, INITIAL ENCOUNTER: Primary | ICD-10-CM

## 2023-01-18 PROCEDURE — 73060 X-RAY EXAM OF HUMERUS: CPT

## 2023-01-18 PROCEDURE — 99211 OFF/OP EST MAY X REQ PHY/QHP: CPT

## 2023-01-18 ASSESSMENT — PAIN DESCRIPTION - PAIN TYPE: TYPE: ACUTE PAIN

## 2023-01-18 ASSESSMENT — PAIN DESCRIPTION - ORIENTATION: ORIENTATION: RIGHT

## 2023-01-18 ASSESSMENT — PAIN - FUNCTIONAL ASSESSMENT: PAIN_FUNCTIONAL_ASSESSMENT: 0-10

## 2023-01-18 ASSESSMENT — PAIN DESCRIPTION - DESCRIPTORS: DESCRIPTORS: ACHING

## 2023-01-18 ASSESSMENT — PAIN SCALES - GENERAL: PAINLEVEL_OUTOF10: 5

## 2023-01-18 NOTE — ED PROVIDER NOTES
[address] ENCOUNTER        Pt Name: Ajith Yoder  MRN: [id number]  [place] 1965  Date of evaluation: 1/18/2023  Provider: ESTELA Duncan - AMA  PCP: Germaine Power MD  Note Started: 6:26 PM EST 1/18/23    CHIEF COMPLAINT       Chief Complaint   Patient presents with    Arm Pain     Right arm pain, feels like something tore in her bicep when she was lifting a box        HISTORY OF PRESENT ILLNESS: 1 or more Elements   History From: patient    Limitations to history : None    Ajith Yoder is a 56 y.o. female who presents pain in her upper arm from the shoulder to the elbow. SHe was lifting boxes at work and something happened to her arm she is felt pain in that upper arm she has just a tiny bruised area in the mid part of the arm she could move it but it is painful but when she gets it up overhead it is fine is just going from extension to flexion to raising that she has the pain in that upper arm. Happened at work today she presents for evaluation. Nursing Notes were all reviewed and agreed with or any disagreements were addressed in the HPI. REVIEW OF SYSTEMS :      Review of Systems    Positives and Pertinent negatives as per HPI.      SURGICAL HISTORY     Past Surgical History:   Procedure Laterality Date    CHOLECYSTECTOMY      FOOT SURGERY      HIP SURGERY  09/09/2019    Left Hip Replacement    HYSTERECTOMY (CERVIX STATUS UNKNOWN)      HYSTERECTOMY ABDOMINAL N/A 02/14/2019    LAPAROSCOPY ROBOT XI ASSISTED REMOVAL OF CERVICAL STUMP WITH BSO CYSTOSCOPY performed by Nadiya Menjivar MD at [address] Right     torn meniscus    OTHER SURGICAL HISTORY Right 03/07/2014    second metatarsal cuneformjoint fusion with bone marrow aspirate    TUMOR REMOVAL      back       CURRENTMEDICATIONS       Previous Medications    AMITRIPTYLINE (ELAVIL) 25 MG TABLET    TAKE 1 TABLET BY MOUTH AT BEDTIME    ASCORBIC ACID (VITAMIN C) 100 MG TABLET    Take by mouth daily    ASPIRIN 81 MG TABLET    Take 81 mg by mouth daily    CALCIUM CARBONATE (OSCAL) 500 MG TABS TABLET    Take 500 mg by mouth daily 2 tablets    CHLORZOXAZONE (PARAFON FORTE) 500 MG TABLET    TAKE 1 TABLET BY MOUTH 4 TIMES DAILY AS NEEDED FOR MUSCLE SPASMS. CHOLECALCIFEROL (VITAMIN D) 50 MCG (2000 UT) CAPS CAPSULE    Take by mouth daily    DICLOFENAC SODIUM (VOLTAREN) 1 % GEL    APPLY TWICE DAILY    HYDROCODONE-ACETAMINOPHEN (NORCO)  MG PER TABLET    TAKE 1 TABLET BY MOUTH EVERY DAY AS NEEDED    IBUPROFEN (ADVIL;MOTRIN) 800 MG TABLET    Take 1 tablet by mouth every 8 hours as needed for Pain    LIDOCAINE (LIDODERM) 5 %    APPLY 1 PATCH DAILY FOR AT LEAST 12 HOURS TO PAINFUL AREA    MEDICAL MARIJUANA    Take by mouth as needed.     MULTIPLE VITAMINS-MINERALS (THERAPEUTIC MULTIVITAMIN-MINERALS) TABLET    Take 1 tablet by mouth daily    OMEGA-3 FATTY ACIDS (FISH OIL) 1000 MG CAPS    Take 3,000 mg by mouth 3 times daily    PROBIOTIC PRODUCT (PROBIOTIC PO)    Take by mouth    THIAMINE HCL (B-1) 100 MG TABS    Take by mouth daily    ZINC 100 MG TABS    Take by mouth daily       ALLERGIES     Percocet [oxycodone-acetaminophen]    FAMILYHISTORY       Family History   Problem Relation Age of Onset    Cancer Mother     Cancer Father         SOCIAL HISTORY       Social History     Tobacco Use    Smoking status: Former     Packs/day: 0.25     Types: Cigarettes     Quit date: 10/11/2014     Years since quittin.2    Smokeless tobacco: Never   Vaping Use    Vaping Use: Never used   Substance Use Topics    Alcohol use: Yes     Comment: occas    Drug use: Yes     Types: Marijuana (Weed)       SCREENINGS                         CIWA Assessment  BP: 133/80  Heart Rate: 77           PHYSICAL EXAM  1 or more Elements     ED Triage Vitals   BP Temp Temp src Heart Rate Resp SpO2 Height Weight   23 1813 23 1813 -- 23 1813 01/18/23 1813 01/18/23 1813 -- 23 1810   133/80 98.2 °F (36.8 °C)  77 18 100 %  179 lb (81.2 kg)       Physical Exam        Constitutional/General: Alert and oriented x3  Head: Normocephalic and atraumatic  Eyes:  conjunctiva normal, sclera non icteric  ENT: , handling secretions,   Neck: Supple, full ROM,   Respiratory:  Not in respiratory distress  Cardiovascular:  Regular rate. Regular rhythm. Musculoskeletal: I did palpate the area on the right upper arm from the elbow to the shoulder and I do not feel any bunching up of the muscle. She is tender to touch especially in the mid part of the upper arm and there is a tiny ecchymotic area that is approximately half a centimeter. There is no redness or swelling. She can flex it but it is painful and she can raise her arm but it is painful until she gets about shoulder level and then she can fully raise her arm. Integument: skin warm and dry. No rashes. Neurologic: GCS 15, no focal deficits, symmetric strength 5/5 in the upper and lower extremities bilaterally  Psychiatric: Normal Affect            DIAGNOSTIC RESULTS   LABS:    Labs Reviewed - No data to display    As interpreted by me, the above displayed labs are abnormal. All other labs obtained during this visit were within normal range or not returned as of this dictation. RADIOLOGY:   Non-plain film images such as CT, Ultrasound and MRI are read by the radiologist. Plain radiographic images are visualized and preliminarily interpreted by the ED Provider with the below findings:        Interpretation per the Radiologist below, if available at the time of this note:    XR HUMERUS RIGHT (MIN 2 VIEWS)   Final Result   No acute osseous abnormality. No results found. No results found.     PROCEDURES   Unless otherwise noted below, none     Procedures      PAST MEDICAL HISTORY/Chronic Conditions Affecting Care      has a past medical history of Anesthesia, Gastritis, Headache, History of muscle pain, IBS (irritable bowel syndrome) (2021), Osteoarthritis, and Osteoporosis (2018). EMERGENCY DEPARTMENT COURSE    Vitals:    Vitals:    01/18/23 1810 01/18/23 1813   BP:  133/80   Pulse:  77   Resp:  18   Temp:  98.2 °F (36.8 °C)   SpO2:  100%   Weight: 179 lb (81.2 kg)        Patient was given the following medications:  Medications - No data to display                Medical Decision Making/Differential Diagnosis:    CC/HPI Summary, Social Determinants of health, Records Reviewed, DDx, testing done/not done, ED Course, Reassessment, disposition considerations/shared decision making with patient, consults, disposition:        Right upper arm lifting boxes I did obtain an x-ray to rule out any fracture or dislocation and it was normal.  She was placed in a sling, advised an ice pack 10 minutes at a time every 2-3 hours and she can take Tylenol or ibuprofen as needed for discomfort. She was put on work restrictions at work not to be lifting with that right arm and she should follow-up with occupational health for reevaluation. CONSULTS: (Who and What was discussed)  None        I am the Primary Clinician of Record. FINAL IMPRESSION      1.  Strain of right biceps, initial encounter          DISPOSITION/PLAN     DISPOSITION Decision To Discharge 01/18/2023 06:46:35 PM      PATIENT REFERRED TO:  Sotero Deluna [address] [place] [address] [id number] [phone]  Schedule an appointment as soon as possible for a visit       DISCHARGE MEDICATIONS:  New Prescriptions    No medications on file       DISCONTINUED MEDICATIONS:  Discontinued Medications    No medications on file              (Please note that portions of this note were completed with a voice recognition program.  Efforts were made to edit the dictations but occasionally words are mis-transcribed.)    ESTELA Naik CNP (electronically signed)          ESTELA Naik CNP  01/18/23 [id number]

## 2024-03-19 ENCOUNTER — OFFICE VISIT (OUTPATIENT)
Dept: NEUROSURGERY | Age: 59
End: 2024-03-19
Payer: COMMERCIAL

## 2024-03-19 VITALS
DIASTOLIC BLOOD PRESSURE: 76 MMHG | HEIGHT: 67 IN | WEIGHT: 191 LBS | HEART RATE: 77 BPM | OXYGEN SATURATION: 95 % | RESPIRATION RATE: 18 BRPM | SYSTOLIC BLOOD PRESSURE: 112 MMHG | BODY MASS INDEX: 29.98 KG/M2

## 2024-03-19 DIAGNOSIS — M54.12 CERVICAL RADICULOPATHY: Primary | ICD-10-CM

## 2024-03-19 PROCEDURE — 99202 OFFICE O/P NEW SF 15 MIN: CPT

## 2024-03-19 PROCEDURE — 99203 OFFICE O/P NEW LOW 30 MIN: CPT | Performed by: PHYSICIAN ASSISTANT

## 2024-03-19 ASSESSMENT — ENCOUNTER SYMPTOMS
ALLERGIC/IMMUNOLOGIC NEGATIVE: 1
EYES NEGATIVE: 1
RESPIRATORY NEGATIVE: 1
GASTROINTESTINAL NEGATIVE: 1

## 2024-03-19 NOTE — PROGRESS NOTES
Subjective:      Patient ID: Shannan Samson is a 59 y.o. female.    Neck Pain   This is a chronic problem. Episode onset: work related injury 4/21/2020. The problem occurs daily. The problem has been unchanged. The pain is present in the midline. The quality of the pain is described as aching. The pain is at a severity of 5/10. The symptoms are aggravated by twisting, stress and bending. Associated symptoms comments: Infrequent arm pain or numbness.. She has tried NSAIDs and acetaminophen (physical therapy, [facility]) for the symptoms. The treatment provided mild relief.       Review of Systems   Constitutional: Negative.    HENT: Negative.     Eyes: Negative.    Respiratory: Negative.     Cardiovascular: Negative.    Gastrointestinal: Negative.    Endocrine: Negative.    Genitourinary: Negative.    Musculoskeletal:  Positive for neck pain.   Skin: Negative.    Allergic/Immunologic: Negative.    Neurological: Negative.    Hematological: Negative.    Psychiatric/Behavioral: Negative.         Objective:   Physical Exam  Constitutional:       Appearance: Normal appearance.   HENT:      Head: Normocephalic and atraumatic.      Nose: Nose normal.   Eyes:      Pupils: Pupils are equal, round, and reactive to light.   Pulmonary:      Effort: Pulmonary effort is normal.   Abdominal:      General: There is no distension.   Skin:     General: Skin is warm and dry.   Neurological:      Mental Status: She is alert.      GCS: GCS eye subscore is 4. GCS verbal subscore is 5. GCS motor subscore is 6.      Cranial Nerves: Cranial nerves 2-12 are intact.      Sensory: Sensation is intact.      Motor: Motor function is intact.      Gait: Gait is intact.      Deep Tendon Reflexes: Reflexes are normal and symmetric.   Psychiatric:         Mood and Affect: Mood normal.         Assessment:      59 year old female with neck pain and intermittent arm pain/numbness in both arms since a work related injury in 2020.  Allowable diagnosis is

## 2024-03-26 ENCOUNTER — HOSPITAL ENCOUNTER (OUTPATIENT)
Dept: MRI IMAGING | Age: 59
Discharge: HOME | End: 2024-03-28
Payer: COMMERCIAL

## 2024-03-26 DIAGNOSIS — M54.12 CERVICAL RADICULOPATHY: ICD-10-CM

## 2024-03-26 PROCEDURE — 72141 MRI NECK SPINE W/O DYE: CPT

## 2024-04-02 ENCOUNTER — OFFICE VISIT (OUTPATIENT)
Dept: NEUROSURGERY | Age: 59
End: 2024-04-02
Payer: COMMERCIAL

## 2024-04-02 VITALS — BODY MASS INDEX: 29.98 KG/M2 | HEIGHT: 67 IN | RESPIRATION RATE: 16 BRPM | WEIGHT: 191 LBS

## 2024-04-02 DIAGNOSIS — M54.12 CERVICAL RADICULOPATHY: Primary | ICD-10-CM

## 2024-04-02 PROCEDURE — 99212 OFFICE O/P EST SF 10 MIN: CPT

## 2024-04-02 PROCEDURE — 99213 OFFICE O/P EST LOW 20 MIN: CPT | Performed by: PHYSICIAN ASSISTANT

## 2024-04-02 ASSESSMENT — ENCOUNTER SYMPTOMS
ALLERGIC/IMMUNOLOGIC NEGATIVE: 1
GASTROINTESTINAL NEGATIVE: 1
EYES NEGATIVE: 1
RESPIRATORY NEGATIVE: 1

## 2025-05-02 NOTE — PROGRESS NOTES
Glenda Koch     Interval History:  Glenda presents today to discuss her persistent right foot pain.  She is status post a left first MTP fusion, doing very well.  Her right foot surgery was done many years ago, at an outside institution.  They report continued pain over her lateral foot, as well as in the plantar first MTP joint.    ASSESSMENT/PLAN:  1. Foot and ankle pain (Primary)  - R foot XR ordered and reviewed  - XR foot right 3+ views; Future    Glenda and I again had a long discussion regarding her right foot pain.  She has pain directly over the staple in her cuboid, as well as pain in the plantar aspect of the first MTP joint, and medial aspect of the IP joint.  We discussed this could be multiple reasons for her pain, but I do think removing the hardware laterally as well as over the first MTP joint would help.  We discussed options for her plantar MTP pain including a plantarflexion osteotomy through the fusion site versus a medial sesamoidectomy.  I think a medial sesamoidectomy has a reasonable chance of improving her pain, and has a much more favorable risk profile than a plantarflexion osteotomy.  She agreed with this, and would like to proceed.    We discussed the surgery in detail, including the expected recovery, outcomes, risks and benefits. They will be non-weight bearing for 3 weeks after surgery. They understand it will take them ~12 months to fully recover from the surgery.     The risks and benefits of surgery were discussed today, including, but not limited to: bleeding ,infection, damage to blood vessels and nerves, nerve pain, wound healing problems, blood clots, and incomplete relief of symptoms. They understood, and agreed to go ahead.     My office will begin scheduling surgery.    Post-op pain medication will be Norco 5/325 mg 1 tab q 4 hrs (#42)  We discussed DVT Prophylaxis, and they will use  mg BID   Pre-operative antibiotics will be Ancef  They will require preoperative  vitamin D labs.    Physical Exam:  Well appearing female in no acute distress; Alert and oriented.  Right  Leg:  Incision is clean dry and intact.  There is no erythema warmth or drainage.  They have palpable pulses, sensation is intact.  They are able to flex and extend their toes and ankles without difficulty  Pain with palpation of the medial plantar first MTP joint.  Pain over the lateral hardware.      Radiographs:  Imaging was ordered today. Final results and radiologist's interpretation, available in the [facility] health record. Images were reviewed with the patient/family members in the office today. My personal interpretation of the performed imaging is 1st MTP fusion, cuboid staple.     Medication, History, and Allergies were reviewed and updated in the medical record.    Danielle Mckeon MD

## 2025-05-05 ENCOUNTER — OFFICE VISIT (OUTPATIENT)
Dept: ORTHOPEDIC SURGERY | Facility: CLINIC | Age: 60
End: 2025-05-05
Payer: COMMERCIAL

## 2025-05-05 ENCOUNTER — HOSPITAL ENCOUNTER (OUTPATIENT)
Dept: RADIOLOGY | Facility: CLINIC | Age: 60
Discharge: HOME | End: 2025-05-05
Payer: COMMERCIAL

## 2025-05-05 DIAGNOSIS — M79.673 FOOT AND ANKLE PAIN: Primary | ICD-10-CM

## 2025-05-05 DIAGNOSIS — M25.579 FOOT AND ANKLE PAIN: ICD-10-CM

## 2025-05-05 DIAGNOSIS — M79.673 FOOT AND ANKLE PAIN: ICD-10-CM

## 2025-05-05 DIAGNOSIS — M25.579 FOOT AND ANKLE PAIN: Primary | ICD-10-CM

## 2025-05-05 PROCEDURE — 99204 OFFICE O/P NEW MOD 45 MIN: CPT | Performed by: ORTHOPAEDIC SURGERY

## 2025-05-05 PROCEDURE — 99214 OFFICE O/P EST MOD 30 MIN: CPT | Performed by: ORTHOPAEDIC SURGERY

## 2025-05-05 PROCEDURE — 73630 X-RAY EXAM OF FOOT: CPT | Mod: RIGHT SIDE | Performed by: RADIOLOGY

## 2025-05-05 PROCEDURE — G2211 COMPLEX E/M VISIT ADD ON: HCPCS | Performed by: ORTHOPAEDIC SURGERY

## 2025-05-05 PROCEDURE — 1036F TOBACCO NON-USER: CPT | Performed by: ORTHOPAEDIC SURGERY

## 2025-05-05 PROCEDURE — 73630 X-RAY EXAM OF FOOT: CPT | Mod: RT

## 2025-05-05 RX ORDER — ZOLPIDEM TARTRATE 10 MG/1
TABLET ORAL
COMMUNITY

## 2025-05-05 RX ORDER — AMOXICILLIN 500 MG/1
CAPSULE ORAL
COMMUNITY

## 2025-05-05 RX ORDER — PROMETHAZINE HYDROCHLORIDE 12.5 MG/1
TABLET ORAL
COMMUNITY

## 2025-05-05 RX ORDER — CEPHALEXIN 500 MG/1
CAPSULE ORAL
COMMUNITY

## 2025-05-05 RX ORDER — FOLIC ACID 1 MG/1
1 TABLET ORAL DAILY
COMMUNITY

## 2025-05-05 RX ORDER — DICLOFENAC SODIUM 75 MG/1
TABLET, DELAYED RELEASE ORAL
COMMUNITY

## 2025-05-05 RX ORDER — METHOTREXATE 2.5 MG/1
7.5 TABLET ORAL WEEKLY
COMMUNITY

## 2025-05-05 RX ORDER — CYCLOBENZAPRINE HCL 5 MG
TABLET ORAL
COMMUNITY

## 2025-05-05 RX ORDER — AZITHROMYCIN 250 MG/1
TABLET, FILM COATED ORAL
COMMUNITY

## 2025-05-05 RX ORDER — ORPHENADRINE CITRATE 100 MG/1
TABLET, EXTENDED RELEASE ORAL
COMMUNITY

## 2025-05-05 RX ORDER — CEFAZOLIN SODIUM 2 G/100ML
2 INJECTION, SOLUTION INTRAVENOUS ONCE
OUTPATIENT
Start: 2025-05-05 | End: 2025-05-05

## 2025-05-05 RX ORDER — BETAMETHASONE DIPROPIONATE 0.5 MG/G
CREAM TOPICAL
COMMUNITY
Start: 2025-01-24

## 2025-05-12 ENCOUNTER — TELEPHONE (OUTPATIENT)
Dept: ORTHOPEDIC SURGERY | Facility: HOSPITAL | Age: 60
End: 2025-05-12
Payer: COMMERCIAL

## 2025-05-12 NOTE — TELEPHONE ENCOUNTER
Copied from CRM #[id number]. Topic: Transfer to Department for Scheduling  >> May 12, 2025  2:33 PM Marisa JARRETT wrote:  Please reach out to patient at earliest convenience. States they discussed surgery and was told to call to schedule it. Please reach out directly to assist. Best # to reach is on file to assist with scheduling. Thank you

## 2025-05-13 PROBLEM — M79.673 FOOT AND ANKLE PAIN: Status: ACTIVE | Noted: 2025-05-05

## 2025-05-13 PROBLEM — M25.579 FOOT AND ANKLE PAIN: Status: ACTIVE | Noted: 2025-05-05

## 2025-07-02 ENCOUNTER — LAB (OUTPATIENT)
Dept: LAB | Facility: HOSPITAL | Age: 60
End: 2025-07-02
Payer: COMMERCIAL

## 2025-07-02 ENCOUNTER — PRE-ADMISSION TESTING (OUTPATIENT)
Dept: PREADMISSION TESTING | Facility: HOSPITAL | Age: 60
End: 2025-07-02
Payer: COMMERCIAL

## 2025-07-02 VITALS
TEMPERATURE: 98.8 F | OXYGEN SATURATION: 99 % | SYSTOLIC BLOOD PRESSURE: 135 MMHG | HEIGHT: 67 IN | DIASTOLIC BLOOD PRESSURE: 86 MMHG | HEART RATE: 70 BPM | RESPIRATION RATE: 16 BRPM | WEIGHT: 200 LBS | BODY MASS INDEX: 31.39 KG/M2

## 2025-07-02 DIAGNOSIS — Z01.818 PREOP TESTING: Primary | ICD-10-CM

## 2025-07-02 DIAGNOSIS — M79.673 PAIN IN UNSPECIFIED FOOT: Primary | ICD-10-CM

## 2025-07-02 LAB
25(OH)D3 SERPL-MCNC: 29 NG/ML (ref 30–100)
ALBUMIN SERPL BCP-MCNC: 4.2 G/DL (ref 3.4–5)
ALP SERPL-CCNC: 67 U/L (ref 33–110)
ALT SERPL W P-5'-P-CCNC: 17 U/L (ref 7–45)
ANION GAP SERPL CALC-SCNC: 11 MMOL/L (ref 10–20)
AST SERPL W P-5'-P-CCNC: 17 U/L (ref 9–39)
BASOPHILS # BLD AUTO: 0.04 X10*3/UL (ref 0–0.1)
BASOPHILS NFR BLD AUTO: 0.6 %
BILIRUB SERPL-MCNC: 0.3 MG/DL (ref 0–1.2)
BUN SERPL-MCNC: 25 MG/DL (ref 6–23)
CALCIUM SERPL-MCNC: 9.4 MG/DL (ref 8.6–10.3)
CHLORIDE SERPL-SCNC: 106 MMOL/L (ref 98–107)
CO2 SERPL-SCNC: 29 MMOL/L (ref 21–32)
CREAT SERPL-MCNC: 0.76 MG/DL (ref 0.5–1.05)
EGFRCR SERPLBLD CKD-EPI 2021: >90 ML/MIN/1.73M*2
EOSINOPHIL # BLD AUTO: 0.25 X10*3/UL (ref 0–0.7)
EOSINOPHIL NFR BLD AUTO: 3.8 %
ERYTHROCYTE [DISTWIDTH] IN BLOOD BY AUTOMATED COUNT: 13 % (ref 11.5–14.5)
EST. AVERAGE GLUCOSE BLD GHB EST-MCNC: 111 MG/DL
GLUCOSE SERPL-MCNC: 75 MG/DL (ref 74–99)
HBA1C MFR BLD: 5.5 % (ref ?–5.7)
HCT VFR BLD AUTO: 39.8 % (ref 36–46)
HGB BLD-MCNC: 13 G/DL (ref 12–16)
IMM GRANULOCYTES # BLD AUTO: 0.01 X10*3/UL (ref 0–0.7)
IMM GRANULOCYTES NFR BLD AUTO: 0.2 % (ref 0–0.9)
LYMPHOCYTES # BLD AUTO: 1.68 X10*3/UL (ref 1.2–4.8)
LYMPHOCYTES NFR BLD AUTO: 25.8 %
MCH RBC QN AUTO: 30.4 PG (ref 26–34)
MCHC RBC AUTO-ENTMCNC: 32.7 G/DL (ref 32–36)
MCV RBC AUTO: 93 FL (ref 80–100)
MONOCYTES # BLD AUTO: 0.65 X10*3/UL (ref 0.1–1)
MONOCYTES NFR BLD AUTO: 10 %
NEUTROPHILS # BLD AUTO: 3.88 X10*3/UL (ref 1.2–7.7)
NEUTROPHILS NFR BLD AUTO: 59.6 %
NRBC BLD-RTO: NORMAL /100{WBCS}
PLATELET # BLD AUTO: 248 X10*3/UL (ref 150–450)
POTASSIUM SERPL-SCNC: 4.5 MMOL/L (ref 3.5–5.3)
PROT SERPL-MCNC: 6.3 G/DL (ref 6.4–8.2)
RBC # BLD AUTO: 4.27 X10*6/UL (ref 4–5.2)
SODIUM SERPL-SCNC: 141 MMOL/L (ref 136–145)
WBC # BLD AUTO: 6.5 X10*3/UL (ref 4.4–11.3)

## 2025-07-02 PROCEDURE — 83036 HEMOGLOBIN GLYCOSYLATED A1C: CPT

## 2025-07-02 PROCEDURE — 80053 COMPREHEN METABOLIC PANEL: CPT

## 2025-07-02 PROCEDURE — 85025 COMPLETE CBC W/AUTO DIFF WBC: CPT

## 2025-07-02 PROCEDURE — 99204 OFFICE O/P NEW MOD 45 MIN: CPT | Performed by: NURSE PRACTITIONER

## 2025-07-02 PROCEDURE — 36415 COLL VENOUS BLD VENIPUNCTURE: CPT

## 2025-07-02 PROCEDURE — 82306 VITAMIN D 25 HYDROXY: CPT

## 2025-07-02 RX ORDER — DOCUSATE SODIUM 100 MG/1
100 CAPSULE, LIQUID FILLED ORAL 2 TIMES DAILY
COMMUNITY

## 2025-07-02 RX ORDER — MELOXICAM 15 MG/1
15 TABLET ORAL DAILY
COMMUNITY

## 2025-07-02 RX ORDER — PYRIDOXINE HCL (VITAMIN B6) 100 MG
100 TABLET ORAL DAILY
COMMUNITY

## 2025-07-02 RX ORDER — CHOLECALCIFEROL (VITAMIN D3) 25 MCG
25 TABLET ORAL DAILY
COMMUNITY

## 2025-07-02 RX ORDER — OXYBUTYNIN CHLORIDE 5 MG/1
1 TABLET, EXTENDED RELEASE ORAL
COMMUNITY
Start: 2025-06-17

## 2025-07-02 ASSESSMENT — PAIN SCALES - GENERAL: PAINLEVEL_OUTOF10: 5 - MODERATE PAIN

## 2025-07-02 ASSESSMENT — PAIN - FUNCTIONAL ASSESSMENT: PAIN_FUNCTIONAL_ASSESSMENT: 0-10

## 2025-07-02 NOTE — CPM/PAT H&P
"CPM/PAT Evaluation       Name: Glenda Koch (Glenda Koch)  /Age: 1965/60 y.o.     In-Person       Chief Complaint: Foot and ankle pain     HPI  Patient is an alert and oriented 60 year old female scheduled for a  REMOVAL, HARDWARE, LOWER EXTREMITY on 25 with Dr. Duque for  Foot and ankle pain . PMHX includes DM2 OA, chronic pain, RA, Sjogren . Presents to [facility] [place] [facility] today for perioperative risk stratification and optimization.     Medical History[1]    Surgical History[2]    Patient  has no history on file for sexual activity.    Family History[3]    Allergies[4]    Prior to Admission medications    Medication Sig Start Date End Date Taking? Authorizing Provider   amoxicillin (Amoxil) 500 mg capsule     Historical Provider, MD   azithromycin (Zithromax) 250 mg tablet     Historical Provider, MD   betamethasone dipropionate 0.05 % cream apply topically to affected area 2 times daily 25   Historical Provider, MD   cephalexin (Keflex) 500 mg capsule     Historical Provider, MD   cyclobenzaprine (Flexeril) 5 mg tablet     Historical Provider, MD   diclofenac (Voltaren) 75 mg EC tablet     Historical Provider, MD   folic acid (Folvite) 1 mg tablet Take 1 tablet (1 mg) by mouth once daily.    Historical Provider, MD   methotrexate (Trexall) 2.5 mg tablet Take 3 tablets (7.5 mg total) by mouth 1 (one) time per week.    Historical Provider, MD   orphenadrine (Norflex) 100 mg 12 hr tablet     Historical Provider, MD   promethazine (Phenergan) 12.5 mg tablet     Historical Provider, MD   zolpidem (Ambien) 10 mg tablet     Historical Provider, MD          Visit Vitals  /86   Pulse 70   Temp 37.1 °C (98.8 °F)   Resp 16   Ht 1.702 m (5' 7\")   Wt 90.7 kg (200 lb)   SpO2 99%   BMI 31.32 kg/m²   Smoking Status Former   BSA 2.07 m²      Review of Systems   Constitutional: Negative for chills, decreased appetite, diaphoresis, fever and malaise/fatigue.   Eyes:  Negative for blurred vision and " double vision.   Cardiovascular:  Negative for chest pain, claudication, cyanosis, dyspnea on exertion, irregular heartbeat, leg swelling, near-syncope and palpitations.   Respiratory:  Negative for cough, hemoptysis, shortness of breath and wheezing.    Endocrine: Negative for cold intolerance, heat intolerance, polydipsia, polyphagia and polyuria.   Gastrointestinal:  Negative for abdominal pain, constipation, diarrhea, dysphagia, nausea and vomiting.   Genitourinary:  Negative for bladder incontinence, dysuria, hematuria, incomplete emptying, nocturia, frequency, pelvic pain and urgency.   Neurological:  Negative for headaches, light-headedness, paresthesias, sensory change and weakness.   Psychiatric/Behavioral:  Negative for altered mental status.    Musculoskeletal: Negative for myalgias, arthralgias     Vitals and nursing note reviewed.     Physical exam  Constitutional:       Appearance: Normal appearance. She is Obese.   HENT:      Head: Normocephalic and atraumatic.      Mouth/Throat:      Mouth: Mucous membranes are moist.      Pharynx: Oropharynx is clear.   Eyes:      Extraocular Movements: Extraocular movements intact.      Conjunctiva/sclera: Conjunctivae normal.      Pupils: Pupils are equal, round, and reactive to light.   Cardiovascular:      PMI at left midclavicular line. Normal rate. Regular rhythm. Normal S1. Normal S2.       Murmurs: There is no murmur.      No gallop.  No click. No rub.       No audible carotid bruit     No lower extremity edema on exam  Pulmonary:      Effort: Pulmonary effort is normal.      Breath sounds: Normal breath sounds.   Abdominal:      General: Abdomen is flat. Bowel sounds are normal.      Palpations: Abdomen is soft and non-tender  Musculoskeletal:      Cervical back: Normal range of motion and neck supple.   Skin:     General: Skin is warm and dry.      Capillary Refill: Capillary refill takes less than 2 seconds.   Neurological:      General: No focal deficit  present.      Mental Status: She is alert and oriented to person, place, and time. Mental status is at baseline.   Psychiatric:         Mood and Affect: Mood normal.         Behavior: Behavior normal.         Thought Content: Thought content normal.         Judgment: Judgment normal.     Vitals and nursing note reviewed. Physical exam within normal limits.     DASI Risk Score      Flowsheet Row Questionnaire Series Submission from 6/21/2025 in [facility] with Generic Provider Sania   Can you take care of yourself (eat, dress, bathe, or use toilet)?  2.75  filed at 06/21/2025 1108   Can you walk indoors, such as around your house? 1.75  filed at 06/21/2025 1108   Can you walk a block or two on level ground?  2.75  filed at 06/21/2025 1108   Can you climb a flight of stairs or walk up a hill? 5.5  filed at 06/21/2025 1108   Can you run a short distance? 0  filed at 06/21/2025 1108   Can you do light work around the house like dusting or washing dishes? 2.7  filed at 06/21/2025 1108   Can you do moderate work around the house like vacuuming, sweeping floors or carrying groceries? 3.5  filed at 06/21/2025 1108   Can you do heavy work around the house like scrubbing floors or lifting and moving heavy furniture?  8  filed at 06/21/2025 1108   Can you do yard work like raking leaves, weeding or pushing a mower? 4.5  filed at 06/21/2025 1108   Can you have sexual relations? 5.25  filed at 06/21/2025 1108   Can you participate in moderate recreational activities like golf, bowling, dancing, doubles tennis or throwing a baseball or football? 0  filed at 06/21/2025 1108   Can you participate in strenous sports like swimming, singles tennis, football, basketball, or skiing? 7.5  filed at 06/21/2025 1108   DASI SCORE 44.2  filed at 06/21/2025 1108   METS Score (Will be calculated only when all the questions are answered) 8.2  filed at 06/21/2025 1108          Caprini DVT Assessment      Flowsheet Row  Pre-Admission Testing from 7/2/2025 in [facility]   DVT Score (IF A SCORE IS NOT CALCULATING, MUST SELECT A BMI TO COMPLETE) 6 filed at 07/02/2025 1054   Surgical Factors Major surgery planned, including arthroscopic and laproscopic (1-2 hours) filed at 07/02/2025 1054   BMI (BMI MUST BE CHOSEN) 31-40 (Obesity) filed at 07/02/2025 1054          Modified Frailty Index    No data to display       [id number] Stroke Risk Points  Current as of 2 minutes ago        N/A 0 to 9 Points:      Last Change: N/A          The [id number] risk score (Lip EMILY, et al. 2009. © 2010 [employer]) quantifies the risk of stroke for a patient with atrial fibrillation. For patients without atrial fibrillation or under the age of 18 this score appears as N/A. Higher score values generally indicate higher risk of stroke.        This score is not applicable to this patient. Components are not calculated.          Revised Cardiac Risk Index      Flowsheet Row Pre-Admission Testing from 7/2/2025 in [facility]   High-Risk Surgery (Intraperitoneal, Intrathoracic,Suprainguinal vascular) 0 filed at 07/02/2025 1057   History of ischemic heart disease (History of MI, History of positive exercuse test, Current chest paint considered due to myocardial ischemia, Use of nitrate therapy, ECG with pathological Q Waves) 0 filed at 07/02/2025 1057   History of congestive heart failure (pulmonary edemia, bilateral rales or S3 gallop, Paroxysmal nocturnal dyspnea, CXR showing pulmonary vascular redistribution) 0 filed at 07/02/2025 1057   History of cerebrovascular disease (Prior TIA or stroke) 0 filed at 07/02/2025 1057   Pre-operative insulin treatment 0 filed at 07/02/2025 1057   Pre-operative creatinine>2 mg/dl 0 filed at 07/02/2025 1057   Revised Cardiac Risk Calculator 0 filed at 07/02/2025 1057          Apfel Simplified Score    No data to display       Risk Analysis Index Results This  Encounter    No data found in the last 10 encounters.       Stop Bang Score      Flowsheet Row Pre-Admission Testing from 7/2/2025 in [facility] Questionnaire Series Submission from 6/21/2025 in [facility] OR with Generic Provider Sania   Do you snore loudly? 0 filed at 07/02/2025 1014 0  filed at 06/21/2025 1108   Do you often feel tired or fatigued after your sleep? 1 filed at 07/02/2025 1014 0  filed at 06/21/2025 1108   Has anyone ever observed you stop breathing in your sleep? 0 filed at 07/02/2025 1014 0  filed at 06/21/2025 1108   Do you have or are you being treated for high blood pressure? 0 filed at 07/02/2025 1014 0  filed at 06/21/2025 1108   Recent BMI (Calculated) 31.3 filed at 07/02/2025 1014 0  filed at 06/21/2025 1108   Is BMI greater than 35 kg/m2? 0=No filed at 07/02/2025 1014 --   Age older than 50 years old? 1=Yes filed at 07/02/2025 1014 1=Yes  filed at 06/21/2025 1108   Is your neck circumference greater than 17 inches (Male) or 16 inches (Female)? 1 filed at 07/02/2025 1014 --   Gender - Male 0=No filed at 07/02/2025 1014 0=No  filed at 06/21/2025 1108   STOP-BANG Total Score 3 filed at 07/02/2025 1014 --          Prodigy: High Risk  Total Score: 0          ARISCAT Score for Postoperative Pulmonary Complications    No data to display       Jacinda Perioperative Risk for Myocardial Infarction or Cardiac Arrest (ELICIA)      Flowsheet Row Pre-Admission Testing from 7/2/2025 in [facility]   Calculated Age Score 1.16 filed at 07/02/2025 1057   Functional Status  0 filed at 07/02/2025 1057   ASA Class  -3.29 filed at 07/02/2025 1057   Creatinine 0 filed at 07/02/2025 1057   Type of Procedure  0.80 filed at 07/02/2025 1057   ELICIA Total Score  -6.58 filed at 07/02/2025 1057   ELICIA % 0.14 filed at 07/02/2025 1057            Assessment & Plan:    Neuro:  No diagnosis or significant findings on chart review or clinical presentation and evaluation.      HEENT/Airway:  No diagnosis or significant findings on chart review or clinical presentation and evaluation.   STOP-BANG Score-3 points moderate risk for HANNAH    Mallampati::  II    TM distance::  >3 FB    Neck ROM::  Full  Dentures- upper and lower partials   Crowns-denies  Implants-denies    Cardiovascular:  No diagnosis or significant findings on chart review or clinical presentation and evaluation.   METS: 8.2  RCRI: 0 points, 3.9%  risk for postoperative MACE   ELICIA: 0.14% risk for postoperative MACE  EKG not indicated     Pulmonary:  No diagnosis or significant findings on chart review or clinical presentation and evaluation.   ARISCAT: <26 points, 1.6% risk of in-hospital postoperative pulmonary complication  PRODIGY: Moderate risk for opioid induced respiratory depression  Smoking History-she quit smoking 11 years ago   Deep breathing handout given    Renal/Urinary:  No diagnosis or significant findings on chart review or clinical presentation and evaluation, however, the patient is at increased risk of perioperative renal complications secondary to age>/= 58. Preventative measures include BP monitoring, medication compliance, and hydration management.   CMP-Pending  Creatinine-  GFR-    Endocrine:  DM2 (metformin, Ozempic) pt aware of clear liquid diet 24 hours prior to surgery   [id number]    Hematologic/Immunology:  RA, Sjogren disease (methotrexate) will need instructions last dose 6/29/25    The patient is not a Jehovah’s witness and will accept blood and blood products if medically indicated.   History of previous blood transfusions No  CBC-Pending  HGB-  Caprini Score 6, patient at Moderate for postoperative DVT. Pt supplied education/VTE handout  Anticoagulation use: No     Gastrointestinal:   No diagnosis or significant findings on chart review or clinical presentation and evaluation.   Recreational drug use: none  Alcohol use none    Infectious disease:   No diagnosis or significant findings  on chart review or clinical presentation and evaluation.     Musculoskeletal:   Foot pain, history of bilateral foot surgery   OA with right BOBBI 2022  Chronic pain (meloxicam)   JHFRAT score- 3 points. low risk for falls    Anesthesia:  ASA 2 - Patient with mild systemic disease with no functional limitations  Anticipated anesthesia-general  History of General anesthesia- yes  Complications- PONV and chills   No family history of anesthesia complications    Labs & Imaging ordered:  CBC, CMP, HBA1C,   Nickel/metal allergy-negative  Shellfish allergy-negative    Discussed with patient medication instructions, NPO guidelines, and any questions or concerns.   Will need instructions for her methotrexate from Dr. Huey Brown    time spent 35  All resulted testing from PAT was forwarded to the surgeon and the patient's PCP if applicable.           [1] No past medical history on file.  [2] No past surgical history on file.  [3] No family history on file.  [4]   Allergies  Allergen Reactions    Oxycodone-Acetaminophen Itching and Swelling

## 2025-07-02 NOTE — PREPROCEDURE INSTRUCTIONS
Medication List            Accurate as of July 2, 2025 10:21 AM. Always use your most recent med list.                amoxicillin 500 mg capsule  Commonly known as: Amoxil  Medication Adjustments for Surgery: Take/Use as prescribed     azithromycin 250 mg tablet  Commonly known as: Zithromax  Medication Adjustments for Surgery: Take/Use as prescribed     betamethasone dipropionate 0.05 % cream  Medication Adjustments for Surgery: Do Not take on the morning of surgery     cephalexin 500 mg capsule  Commonly known as: Keflex  Medication Adjustments for Surgery: Take/Use as prescribed     cyclobenzaprine 5 mg tablet  Commonly known as: Flexeril  Medication Adjustments for Surgery: Take/Use as prescribed     diclofenac 75 mg EC tablet  Commonly known as: Voltaren  Additional Medication Adjustments for Surgery: Take last dose 7 days before surgery  Notes to patient: last dose preoperatively on 7/9/25     docusate sodium 100 mg capsule  Commonly known as: Colace     folic acid 1 mg tablet  Commonly known as: Folvite  Additional Medication Adjustments for Surgery: Take last dose 7 days before surgery  Notes to patient: last dose preoperatively on 7/9/25     meloxicam 15 mg tablet  Commonly known as: Mobic  Additional Medication Adjustments for Surgery: Take last dose 7 days before surgery  Notes to patient: last dose preoperatively on 7/9/25     methotrexate 2.5 mg tablet  Commonly known as: Trexall  Additional Medication Adjustments for Surgery: Other (Comment)  Notes to patient: Will need instructions from your provider     orphenadrine 100 mg 12 hr tablet  Commonly known as: Norflex  Medication Adjustments for Surgery: Do Not take on the morning of surgery     oxyBUTYnin XL 5 mg 24 hr tablet  Commonly known as: Ditropan-XL  Medication Adjustments for Surgery: Do Not take on the morning of surgery     promethazine 12.5 mg tablet  Commonly known as: Phenergan  Medication Adjustments for Surgery: Do Not take on the  morning of surgery     pyridoxine 100 mg tablet  Commonly known as: Vitamin B-6  Additional Medication Adjustments for Surgery: Take last dose 7 days before surgery  Notes to patient: last dose preoperatively on 7/9/25     Vitamin D3 25 mcg (1,000 units) tablet  Generic drug: cholecalciferol  Additional Medication Adjustments for Surgery: Take last dose 7 days before surgery  Notes to patient: last dose preoperatively on 7/9/25     zolpidem 10 mg tablet  Commonly known as: Ambien  Medication Adjustments for Surgery: Do Not take on the morning of surgery            NPO Instructions:     Do not eat any food after midnight the night before your surgery/procedure.  You may have clear liquids until TWO hours before surgery/procedure. This includes water, black tea/coffee, (no milk or cream) apple juice and electrolyte drinks (Gatorade).  You may chew gum up to TWO hours before your surgery/procedure.     Additional Instructions:      Seven/Six Days before Surgery:  Review your medication instructions, stop indicated medications  Five Days before Surgery:  Review your medication instructions, stop indicated medications  Three Days before Surgery:  Review your medication instructions, stop indicated medications  The Day before Surgery:  No smoking or alcohol use 24 hours before surgery  Review your medication instructions, stop indicated medications  You will be contacted regarding the time of your arrival to facility and surgery time  Do not eat any food after Midnight  Day of Surgery:  Review your medication instructions, take indicated medications  If you have diabetes, please check your fasting blood sugar upon awakening.  If fasting blood sugar is <80 mg/dl, drink 100 ml of apple juice, time limit of 2 hours before  You may have clear liquids until TWO hours before surgery/procedure.  This includes water, black tea/coffee, (no milk or cream) apple juice and electrolyte drinks (Gatorade)  You may chew gum up to TWO  hours before your surgery/procedure  Wear  comfortable loose fitting clothing  Do not use moisturizers, creams, lotions or perfume  All jewelry and valuables should be left at home     CONTACT SURGEON'S OFFICE IF YOU DEVELOP:  * Fever = 100.4 F   * New respiratory symptoms (e.g. cough, shortness of breath, respiratory distress, sore throat)  * Recent loss of taste or smell  *Flu like symptoms such as headache, fatigue or gastrointestinal symptoms  * You develop any open sores, shingles, burning or painful urination   AND/OR:  * You no longer wish to have the surgery.  * Any other personal circumstances change that may lead to the need to cancel or defer this surgery.  *You were admitted to any hospital within one week of your planned procedure.     SMOKING:  *Quitting smoking can make a huge difference to your health and recovery from surgery.    *If you need help with quitting, call 2-174-QUIT-NOW.     THE DAY BEFORE SURGERY:  *Do not eat any food after midnight the night before your surgery.   *You may have up to TEN OUNCES of clear liquids until TWO hours before your instructed ARRIVAL TIME to hospital. This includes water, black tea/coffee, (no milk or cream) apple juice, clear broth and electrolyte drinks (Gatorade). Please avoid clear liquids that are red in color.   *You may chew gum/mints up to TWO hours before your surgery/procedure.     SURGICAL TIME:  *You will be contacted between 2 p.m. and 3 p.m. the business day before your surgery with your arrival time.  *If you haven't received a call by 3pm, call [phone]  *Scheduled surgery times may change and you will be notified if this occurs-check your personal voicemail for any updates.     ON THE MORNING OF SURGERY:  *Wear comfortable, loose fitting clothing.   *Do not use moisturizers, creams, lotions or perfume.  *All jewelry and valuables should be left at home.  *Prosthetic devices such as contact lenses, hearing aids, dentures, eyelash  extensions, hairpins and body piercing must be removed before surgery.     BRING WITH YOU:  *Photo ID and insurance card  *Current list of medications and allergies  *Pacemaker/Defibrillator/Heart stent cards  *CPAP machine and mask  *Slings/splints/crutches  *Copy of your complete Advanced Directive/DHPOA-if applicable  *Neurostimulator implant remote     PARKING AND ARRIVAL:  *Check in at the Main Entrance desk and let them know you are here for surgery.     IF YOU ARE HAVING OUTPATIENT/SAME DAY SURGERY:  *A responsible adult MUST accompany you at the time of discharge and stay with you for 24 hours after your surgery.  *You may NOT drive yourself home after surgery.  *You may use a taxi or ride sharing service ([employer], Uber) to return home ONLY if you are accompanied by a friend or family member.  *Instructions for resuming your medications will be provided by your surgeon.     Thank you for coming to Pre Admission testing.      If I have prescribed medication please don't forget to  at your pharmacy.      Any questions about today's visit call [phone] and leave a message in the general mailbox.     Patient instructed to ambulate as soon as possible postoperatively to decrease thromboembolic risk.     Thank you for visiting [facility].  If you have any changes to your health condition, please call the surgeons office to alert them and give them details of your symptoms.        Preoperative Fasting Guidelines     Why must I stop eating and drinking near surgery time?  With sedation, food or liquid in your stomach can enter your lungs causing serious complications  Increases nausea and vomiting     When do I need to stop eating and drinking before my surgery?  Do not eat any food after midnight the night before your surgery/procedure.  You may have up to TEN ounces of clear liquid until TWO hours before your instructed arrival time to the hospital.  This includes water, black  tea/coffee, (no milk or cream) apple juice, and electrolyte drinks (Gatorade)  You may chew gum until TWO hours before your surgery/procedure        Additional Instructions:      The Day before Surgery:  -Review your medication instructions, stop indicated medications  -You will be contacted in the evening regarding the time of your arrival to facility and surgery time     Day of Surgery:  -Review your medication instructions, take indicated medications  -Wear comfortable loose fitting clothing  -Do not use moisturizers, creams, lotions or perfume  -All jewelry and valuables should be left at home                   Preoperative Brain Exercises     What are brain exercises?  A brain exercise is any activity that engages your thinking (cognitive) skills.     What types of activities are considered brain exercises?  Jigsaw puzzles, crossword puzzles, word jumble, memory games, word search, and many more.  Many can be found free online or on your phone via a mobile abigail.     Why should I do brain exercises before my surgery?  More recent research has shown brain exercise before surgery can lower the risk of postoperative delirium (confusion) which can be especially important for older adults.  Patients who did brain exercises for 5 to 10 minutes/day in the days before surgery, cut their risk of postoperative delirium in half up to 1 week after surgery.                         [facility]     Preoperative Deep Breathing Exercises     Why it is important to do deep breathing exercises before my surgery?  Deep breathing exercises strengthen your breathing muscles.  This helps you to recover after your surgery and decreases the chance of breathing complications.        How are the deep breathing exercises done?  Sit straight with your back supported.  Breathe in deeply and slowly through your nose. Your lower rib cage should expand and your abdomen may move forward.  Hold that breath for 3 to 5  seconds.  Breathe out through pursed lips, slowly and completely.  Rest and repeat 10 times every hour while awake.  Rest longer if you become dizzy or lightheaded.                      [facility]     Preoperative Deep Breathing Exercises     Why it is important to do deep breathing exercises before my surgery?  Deep breathing exercises strengthen your breathing muscles.  This helps you to recover after your surgery and decreases the chance of breathing complications.        How are the deep breathing exercises done?  Sit straight with your back supported.  Breathe in deeply and slowly through your nose. Your lower rib cage should expand and your abdomen may move forward.  Hold that breath for 3 to 5 seconds.  Breathe out through pursed lips, slowly and completely.  Rest and repeat 10 times every hour while awake.  Rest longer if you become dizzy or lightheaded.        Patient Information: Incentive Spirometer  What is an incentive spirometer?  An incentive spirometer is a device used before and after surgery to “exercise” your lungs.  It helps you to take deeper breaths to expand your lungs.  Below is an example of a basic incentive spirometer.  The device you receive may differ slightly but they all function the same.    Why do I need to use an incentive spirometer?  Using your incentive spirometer prepares your lungs for surgery and helps prevent lung problems after surgery.  How do I use my incentive spirometer?  When you're using your incentive spirometer, make sure to breathe through your mouth. If you breathe through your nose, the incentive spirometer won't work properly. You can hold your nose if you have trouble.  If you feel dizzy at any time, stop and rest. Try again at a later time.  Follow the steps below:  Set up your incentive spirometer, expand the flexible tubing and connect to the outlet.  Sit upright in a chair or bed. Hold the incentive spirometer at eye level.   Put  the mouthpiece in your mouth and close your lips tightly around it. Slowly breathe out (exhale) completely.  Breathe in (inhale) slowly through your mouth as deeply as you can. As you take a breath, you will see the piston rise inside the large column. While the piston rises, the indicator should move upwards. It should stay in between the 2 arrows (see Figure).  Try to get the piston as high as you can, while keeping the indicator between the arrows.   If the indicator doesn't stay between the arrows, you're breathing either too fast or too slow.  When you get it as high as you can, hold your breath for 10 seconds, or as long as possible. While you're holding your breath, the piston will slowly fall to the base of the spirometer.  Once the piston reaches the bottom of the spirometer, breathe out slowly through your mouth. Rest for a few seconds.  Repeat 10 times. Try to get the piston to the same level with each breath.  Repeat every hour while awake  You can carefully clean the outside of the mouthpiece with an alcohol wipe or soap and water.       Patient and Family Education             Ways You Can Help Prevent Blood Clots                    This handout explains some simple things you can do to help prevent blood clots.      Blood clots are blockages that can form in the body's veins. When a blood clot forms in your deep veins, it may be called a deep vein thrombosis, or DVT for short. Blood clots can happen in any part of the body where blood flows, but they are most common in the arms and legs. If a piece of a blood clot breaks free and travels to the lungs, it is called a pulmonary embolus (PE). A PE can be a very serious problem.         Being in the hospital or having surgery can raise your chances of getting a blood clot because you may not be well enough to move around as much as you normally do.         Ways you can help prevent blood clots in the hospital           Wearing SCDs. SCDs stands for  Sequential Compression Devices.   SCDs are special sleeves that wrap around your legs  They attach to a pump that fills them with air to gently squeeze your legs every few minutes.   This helps return the blood in your legs to your heart.   SCDs should only be taken off when walking or bathing.   SCDs may not be comfortable, but they can help save your life.                                            Wearing compression stockings - if your doctor orders them. These special snug fitting stockings gently squeeze your legs to help blood flow.       Walking. Walking helps move the blood in your legs.   If your doctor says it is ok, try walking the halls at least   5 times a day. Ask us to help you get up, so you don't fall.      Taking any blood thinning medicines your doctor orders.        Page 1 of 2            [facility]; 3/23   Ways you can help prevent blood clots at home         Wearing compression stockings - if your doctor orders them. ? Walking - to help move the blood in your legs.       Taking any blood thinning medicines your doctor orders.      Signs of a blood clot or PE        Tell your doctor or nurse know right away if you have of the problems listed below.    If you are at home, seek medical care right away. Call 911 for chest pain or problems breathing.                Signs of a blood clot (DVT) - such as pain,  swelling, redness or warmth in your arm or leg      Signs of a pulmonary embolism (PE) - such as chest pain or feeling short of breath      Preoperative Clearances  -If you are informed by the preadmission testing team that you need clearance for your surgery, please reach out to the provider in question to assisting accommodating obtaining your clearance.   -Please have you provider fax your clearance letter to [phone] if needed.   -A blank clearance letter will be provided to you if indicated.     Anticoagulation  -If you are on oral anticoagulation such as Coumadin, Eliquis,  Xarelto, Plavix, Brilinta, Pradaxa; we will need to obtain preoperative anticoagulation instructions from the prescribing provider.   -We will reach out to the prescriber but do encourage you to call their office as well as it will increase the chances of getting the necessary information.   -We will contact you with the instruction once we obtain them.

## 2025-07-02 NOTE — H&P (VIEW-ONLY)
"CPM/PAT Evaluation       Name: Glenda Koch (Glenda Koch)  /Age: 1965/60 y.o.     In-Person       Chief Complaint: Foot and ankle pain     HPI  Patient is an alert and oriented 60 year old female scheduled for a  REMOVAL, HARDWARE, LOWER EXTREMITY on 25 with Dr. Duque for  Foot and ankle pain . PMHX includes DM2 OA, chronic pain, RA, Sjogren . Presents to [facility] PAT today for perioperative risk stratification and optimization.     Medical History[1]    Surgical History[2]    Patient  has no history on file for sexual activity.    Family History[3]    Allergies[4]    Prior to Admission medications    Medication Sig Start Date End Date Taking? Authorizing Provider   amoxicillin (Amoxil) 500 mg capsule     Historical Provider, MD   azithromycin (Zithromax) 250 mg tablet     Historical Provider, MD   betamethasone dipropionate 0.05 % cream apply topically to affected area 2 times daily 25   Historical Provider, MD   cephalexin (Keflex) 500 mg capsule     Historical Provider, MD   cyclobenzaprine (Flexeril) 5 mg tablet     Historical Provider, MD   diclofenac (Voltaren) 75 mg EC tablet     Historical Provider, MD   folic acid (Folvite) 1 mg tablet Take 1 tablet (1 mg) by mouth once daily.    Historical Provider, MD   methotrexate (Trexall) 2.5 mg tablet Take 3 tablets (7.5 mg total) by mouth 1 (one) time per week.    Historical Provider, MD   orphenadrine (Norflex) 100 mg 12 hr tablet     Historical Provider, MD   promethazine (Phenergan) 12.5 mg tablet     Historical Provider, MD   zolpidem (Ambien) 10 mg tablet     Historical Provider, MD          Visit Vitals  /86   Pulse 70   Temp 37.1 °C (98.8 °F)   Resp 16   Ht 1.702 m (5' 7\")   Wt 90.7 kg (200 lb)   SpO2 99%   BMI 31.32 kg/m²   Smoking Status Former   BSA 2.07 m²      Review of Systems   Constitutional: Negative for chills, decreased appetite, diaphoresis, fever and malaise/fatigue.   Eyes:  Negative for blurred vision and " double vision.   Cardiovascular:  Negative for chest pain, claudication, cyanosis, dyspnea on exertion, irregular heartbeat, leg swelling, near-syncope and palpitations.   Respiratory:  Negative for cough, hemoptysis, shortness of breath and wheezing.    Endocrine: Negative for cold intolerance, heat intolerance, polydipsia, polyphagia and polyuria.   Gastrointestinal:  Negative for abdominal pain, constipation, diarrhea, dysphagia, nausea and vomiting.   Genitourinary:  Negative for bladder incontinence, dysuria, hematuria, incomplete emptying, nocturia, frequency, pelvic pain and urgency.   Neurological:  Negative for headaches, light-headedness, paresthesias, sensory change and weakness.   Psychiatric/Behavioral:  Negative for altered mental status.    Musculoskeletal: Negative for myalgias, arthralgias     Vitals and nursing note reviewed.     Physical exam  Constitutional:       Appearance: Normal appearance. She is Obese.   HENT:      Head: Normocephalic and atraumatic.      Mouth/Throat:      Mouth: Mucous membranes are moist.      Pharynx: Oropharynx is clear.   Eyes:      Extraocular Movements: Extraocular movements intact.      Conjunctiva/sclera: Conjunctivae normal.      Pupils: Pupils are equal, round, and reactive to light.   Cardiovascular:      PMI at left midclavicular line. Normal rate. Regular rhythm. Normal S1. Normal S2.       Murmurs: There is no murmur.      No gallop.  No click. No rub.       No audible carotid bruit     No lower extremity edema on exam  Pulmonary:      Effort: Pulmonary effort is normal.      Breath sounds: Normal breath sounds.   Abdominal:      General: Abdomen is flat. Bowel sounds are normal.      Palpations: Abdomen is soft and non-tender  Musculoskeletal:      Cervical back: Normal range of motion and neck supple.   Skin:     General: Skin is warm and dry.      Capillary Refill: Capillary refill takes less than 2 seconds.   Neurological:      General: No focal deficit  present.      Mental Status: She is alert and oriented to person, place, and time. Mental status is at baseline.   Psychiatric:         Mood and Affect: Mood normal.         Behavior: Behavior normal.         Thought Content: Thought content normal.         Judgment: Judgment normal.     Vitals and nursing note reviewed. Physical exam within normal limits.     DASI Risk Score      Flowsheet Row Questionnaire Series Submission from 6/21/2025 in [facility] OR with Generic Provider Sania   Can you take care of yourself (eat, dress, bathe, or use toilet)?  2.75  filed at 06/21/2025 1108   Can you walk indoors, such as around your house? 1.75  filed at 06/21/2025 1108   Can you walk a block or two on level ground?  2.75  filed at 06/21/2025 1108   Can you climb a flight of stairs or walk up a hill? 5.5  filed at 06/21/2025 1108   Can you run a short distance? 0  filed at 06/21/2025 1108   Can you do light work around the house like dusting or washing dishes? 2.7  filed at 06/21/2025 1108   Can you do moderate work around the house like vacuuming, sweeping floors or carrying groceries? 3.5  filed at 06/21/2025 1108   Can you do heavy work around the house like scrubbing floors or lifting and moving heavy furniture?  8  filed at 06/21/2025 1108   Can you do yard work like raking leaves, weeding or pushing a mower? 4.5  filed at 06/21/2025 1108   Can you have sexual relations? 5.25  filed at 06/21/2025 1108   Can you participate in moderate recreational activities like golf, bowling, dancing, doubles tennis or throwing a baseball or football? 0  filed at 06/21/2025 1108   Can you participate in strenous sports like swimming, singles tennis, football, basketball, or skiing? 7.5  filed at 06/21/2025 1108   DASI SCORE 44.2  filed at 06/21/2025 1108   METS Score (Will be calculated only when all the questions are answered) 8.2  filed at 06/21/2025 1108          Caprini DVT Assessment      Flowsheet Row  Pre-Admission Testing from 7/2/2025 in [facility]   DVT Score (IF A SCORE IS NOT CALCULATING, MUST SELECT A BMI TO COMPLETE) 6 filed at 07/02/2025 1054   Surgical Factors Major surgery planned, including arthroscopic and laproscopic (1-2 hours) filed at 07/02/2025 1054   BMI (BMI MUST BE CHOSEN) 31-40 (Obesity) filed at 07/02/2025 1054          Modified Frailty Index    No data to display       [id number] Stroke Risk Points  Current as of 2 minutes ago        N/A 0 to 9 Points:      Last Change: N/A          The [id number] risk score (Lip EMILY, et al. 2009. © 2010 [employer]) quantifies the risk of stroke for a patient with atrial fibrillation. For patients without atrial fibrillation or under the age of 18 this score appears as N/A. Higher score values generally indicate higher risk of stroke.        This score is not applicable to this patient. Components are not calculated.          Revised Cardiac Risk Index      Flowsheet Row Pre-Admission Testing from 7/2/2025 in [facility]   High-Risk Surgery (Intraperitoneal, Intrathoracic,Suprainguinal vascular) 0 filed at 07/02/2025 1057   History of ischemic heart disease (History of MI, History of positive exercuse test, Current chest paint considered due to myocardial ischemia, Use of nitrate therapy, ECG with pathological Q Waves) 0 filed at 07/02/2025 1057   History of congestive heart failure (pulmonary edemia, bilateral rales or S3 gallop, Paroxysmal nocturnal dyspnea, CXR showing pulmonary vascular redistribution) 0 filed at 07/02/2025 1057   History of cerebrovascular disease (Prior TIA or stroke) 0 filed at 07/02/2025 1057   Pre-operative insulin treatment 0 filed at 07/02/2025 1057   Pre-operative creatinine>2 mg/dl 0 filed at 07/02/2025 1057   Revised Cardiac Risk Calculator 0 filed at 07/02/2025 1057          Apfel Simplified Score    No data to display       Risk Analysis Index Results This  Encounter    No data found in the last 10 encounters.       Stop Bang Score      Flowsheet Row Pre-Admission Testing from 7/2/2025 in [facility] Questionnaire Series Submission from 6/21/2025 in [facility] OR with Generic Provider Sania   Do you snore loudly? 0 filed at 07/02/2025 1014 0  filed at 06/21/2025 1108   Do you often feel tired or fatigued after your sleep? 1 filed at 07/02/2025 1014 0  filed at 06/21/2025 1108   Has anyone ever observed you stop breathing in your sleep? 0 filed at 07/02/2025 1014 0  filed at 06/21/2025 1108   Do you have or are you being treated for high blood pressure? 0 filed at 07/02/2025 1014 0  filed at 06/21/2025 1108   Recent BMI (Calculated) 31.3 filed at 07/02/2025 1014 0  filed at 06/21/2025 1108   Is BMI greater than 35 kg/m2? 0=No filed at 07/02/2025 1014 --   Age older than 50 years old? 1=Yes filed at 07/02/2025 1014 1=Yes  filed at 06/21/2025 1108   Is your neck circumference greater than 17 inches (Male) or 16 inches (Female)? 1 filed at 07/02/2025 1014 --   Gender - Male 0=No filed at 07/02/2025 1014 0=No  filed at 06/21/2025 1108   STOP-BANG Total Score 3 filed at 07/02/2025 1014 --          Prodigy: High Risk  Total Score: 0          ARISCAT Score for Postoperative Pulmonary Complications    No data to display       Jacinda Perioperative Risk for Myocardial Infarction or Cardiac Arrest (ELICIA)      Flowsheet Row Pre-Admission Testing from 7/2/2025 in [facility]   Calculated Age Score 1.16 filed at 07/02/2025 1057   Functional Status  0 filed at 07/02/2025 1057   ASA Class  -3.29 filed at 07/02/2025 1057   Creatinine 0 filed at 07/02/2025 1057   Type of Procedure  0.80 filed at 07/02/2025 1057   ELICIA Total Score  -6.58 filed at 07/02/2025 1057   ELICIA % 0.14 filed at 07/02/2025 1057            Assessment & Plan:    Neuro:  No diagnosis or significant findings on chart review or clinical presentation and evaluation.      HEENT/Airway:  No diagnosis or significant findings on chart review or clinical presentation and evaluation.   STOP-BANG Score-3 points moderate risk for HANNAH    Mallampati::  II    TM distance::  >3 FB    Neck ROM::  Full  Dentures- upper and lower partials   Crowns-denies  Implants-denies    Cardiovascular:  No diagnosis or significant findings on chart review or clinical presentation and evaluation.   METS: 8.2  RCRI: 0 points, 3.9%  risk for postoperative MACE   ELICIA: 0.14% risk for postoperative MACE  EKG not indicated     Pulmonary:  No diagnosis or significant findings on chart review or clinical presentation and evaluation.   ARISCAT: <26 points, 1.6% risk of in-hospital postoperative pulmonary complication  PRODIGY: Moderate risk for opioid induced respiratory depression  Smoking History-she quit smoking 11 years ago   Deep breathing handout given    Renal/Urinary:  No diagnosis or significant findings on chart review or clinical presentation and evaluation, however, the patient is at increased risk of perioperative renal complications secondary to age>/= 56. Preventative measures include BP monitoring, medication compliance, and hydration management.   CMP-Pending  Creatinine-  GFR-    Endocrine:  DM2 (metformin, Ozempic) pt aware of clear liquid diet 24 hours prior to surgery   [id number]    Hematologic/Immunology:  RA, Sjogren disease (methotrexate) will need instructions last dose 6/29/25    The patient is not a Jehovah’s witness and will accept blood and blood products if medically indicated.   History of previous blood transfusions No  CBC-Pending  HGB-  Caprini Score 6, patient at Moderate for postoperative DVT. Pt supplied education/VTE handout  Anticoagulation use: No     Gastrointestinal:   No diagnosis or significant findings on chart review or clinical presentation and evaluation.   Recreational drug use: none  Alcohol use none    Infectious disease:   No diagnosis or significant findings  on chart review or clinical presentation and evaluation.     Musculoskeletal:   Foot pain, history of bilateral foot surgery   OA with right BOBBI 2022  Chronic pain (meloxicam)   JHFRAT score- 3 points. low risk for falls    Anesthesia:  ASA 2 - Patient with mild systemic disease with no functional limitations  Anticipated anesthesia-general  History of General anesthesia- yes  Complications- PONV and chills   No family history of anesthesia complications    Labs & Imaging ordered:  CBC, CMP, HBA1C,   Nickel/metal allergy-negative  Shellfish allergy-negative    Discussed with patient medication instructions, NPO guidelines, and any questions or concerns.   Will need instructions for her methotrexate from Dr. Huey Brown  - made >8 attempts to contact Dr. Brown's office. Pt has been off her methotrexate for >2 weeks.    time spent 35  All resulted testing from PAT was forwarded to the surgeon and the patient's PCP if applicable.           [1] No past medical history on file.  [2] No past surgical history on file.  [3] No family history on file.  [4]   Allergies  Allergen Reactions    Oxycodone-Acetaminophen Itching and Swelling

## 2025-07-14 ENCOUNTER — ANESTHESIA (OUTPATIENT)
Dept: OPERATING ROOM | Facility: HOSPITAL | Age: 60
End: 2025-07-14
Payer: COMMERCIAL

## 2025-07-14 ENCOUNTER — HOSPITAL ENCOUNTER (OUTPATIENT)
Facility: HOSPITAL | Age: 60
Setting detail: OUTPATIENT SURGERY
Discharge: HOME | End: 2025-07-14
Attending: ORTHOPAEDIC SURGERY | Admitting: ORTHOPAEDIC SURGERY
Payer: COMMERCIAL

## 2025-07-14 ENCOUNTER — ANESTHESIA EVENT (OUTPATIENT)
Dept: OPERATING ROOM | Facility: HOSPITAL | Age: 60
End: 2025-07-14
Payer: COMMERCIAL

## 2025-07-14 ENCOUNTER — APPOINTMENT (OUTPATIENT)
Dept: RADIOLOGY | Facility: HOSPITAL | Age: 60
End: 2025-07-14
Payer: COMMERCIAL

## 2025-07-14 VITALS
OXYGEN SATURATION: 97 % | WEIGHT: 199 LBS | RESPIRATION RATE: 16 BRPM | DIASTOLIC BLOOD PRESSURE: 87 MMHG | HEART RATE: 71 BPM | TEMPERATURE: 97.7 F | HEIGHT: 67 IN | BODY MASS INDEX: 31.23 KG/M2 | SYSTOLIC BLOOD PRESSURE: 133 MMHG

## 2025-07-14 DIAGNOSIS — M79.673 FOOT AND ANKLE PAIN: Primary | ICD-10-CM

## 2025-07-14 DIAGNOSIS — M25.579 FOOT AND ANKLE PAIN: Primary | ICD-10-CM

## 2025-07-14 PROCEDURE — A20680 PR REMOVAL DEEP IMPLANT: Performed by: ANESTHESIOLOGIST ASSISTANT

## 2025-07-14 PROCEDURE — 2500000004 HC RX 250 GENERAL PHARMACY W/ HCPCS (ALT 636 FOR OP/ED): Performed by: STUDENT IN AN ORGANIZED HEALTH CARE EDUCATION/TRAINING PROGRAM

## 2025-07-14 PROCEDURE — 3600000004 HC OR TIME - INITIAL BASE CHARGE - PROCEDURE LEVEL FOUR: Performed by: ORTHOPAEDIC SURGERY

## 2025-07-14 PROCEDURE — 7100000009 HC PHASE TWO TIME - INITIAL BASE CHARGE: Performed by: ORTHOPAEDIC SURGERY

## 2025-07-14 PROCEDURE — 20680 REMOVAL OF IMPLANT DEEP: CPT | Performed by: ORTHOPAEDIC SURGERY

## 2025-07-14 PROCEDURE — 76000 FLUOROSCOPY <1 HR PHYS/QHP: CPT | Mod: 59

## 2025-07-14 PROCEDURE — A20680 PR REMOVAL DEEP IMPLANT: Performed by: ANESTHESIOLOGY

## 2025-07-14 PROCEDURE — 7100000010 HC PHASE TWO TIME - EACH INCREMENTAL 1 MINUTE: Performed by: ORTHOPAEDIC SURGERY

## 2025-07-14 PROCEDURE — 3700000002 HC GENERAL ANESTHESIA TIME - EACH INCREMENTAL 1 MINUTE: Performed by: ORTHOPAEDIC SURGERY

## 2025-07-14 PROCEDURE — 7100000001 HC RECOVERY ROOM TIME - INITIAL BASE CHARGE: Performed by: ORTHOPAEDIC SURGERY

## 2025-07-14 PROCEDURE — 2500000004 HC RX 250 GENERAL PHARMACY W/ HCPCS (ALT 636 FOR OP/ED): Performed by: ANESTHESIOLOGIST ASSISTANT

## 2025-07-14 PROCEDURE — 64445 NJX AA&/STRD SCIATIC NRV IMG: CPT | Performed by: STUDENT IN AN ORGANIZED HEALTH CARE EDUCATION/TRAINING PROGRAM

## 2025-07-14 PROCEDURE — 2720000007 HC OR 272 NO HCPCS: Performed by: ORTHOPAEDIC SURGERY

## 2025-07-14 PROCEDURE — 3600000009 HC OR TIME - EACH INCREMENTAL 1 MINUTE - PROCEDURE LEVEL FOUR: Performed by: ORTHOPAEDIC SURGERY

## 2025-07-14 PROCEDURE — 3700000001 HC GENERAL ANESTHESIA TIME - INITIAL BASE CHARGE: Performed by: ORTHOPAEDIC SURGERY

## 2025-07-14 PROCEDURE — 28315 REMOVAL OF SESAMOID BONE: CPT | Performed by: ORTHOPAEDIC SURGERY

## 2025-07-14 PROCEDURE — 2500000004 HC RX 250 GENERAL PHARMACY W/ HCPCS (ALT 636 FOR OP/ED): Performed by: ORTHOPAEDIC SURGERY

## 2025-07-14 PROCEDURE — 2500000005 HC RX 250 GENERAL PHARMACY W/O HCPCS: Performed by: ORTHOPAEDIC SURGERY

## 2025-07-14 PROCEDURE — 2500000004 HC RX 250 GENERAL PHARMACY W/ HCPCS (ALT 636 FOR OP/ED): Performed by: ANESTHESIOLOGY

## 2025-07-14 PROCEDURE — 7100000002 HC RECOVERY ROOM TIME - EACH INCREMENTAL 1 MINUTE: Performed by: ORTHOPAEDIC SURGERY

## 2025-07-14 RX ORDER — ASPIRIN 325 MG
325 TABLET, DELAYED RELEASE (ENTERIC COATED) ORAL 2 TIMES DAILY
Qty: 56 TABLET | Refills: 0 | Status: SHIPPED | OUTPATIENT
Start: 2025-07-14 | End: 2025-08-11

## 2025-07-14 RX ORDER — MIDAZOLAM HYDROCHLORIDE 1 MG/ML
2 INJECTION, SOLUTION INTRAMUSCULAR; INTRAVENOUS ONCE
Status: COMPLETED | OUTPATIENT
Start: 2025-07-14 | End: 2025-07-14

## 2025-07-14 RX ORDER — ALBUTEROL SULFATE 0.83 MG/ML
2.5 SOLUTION RESPIRATORY (INHALATION) ONCE AS NEEDED
Status: DISCONTINUED | OUTPATIENT
Start: 2025-07-14 | End: 2025-07-14 | Stop reason: HOSPADM

## 2025-07-14 RX ORDER — HYDRALAZINE HYDROCHLORIDE 20 MG/ML
5 INJECTION INTRAMUSCULAR; INTRAVENOUS EVERY 30 MIN PRN
Status: DISCONTINUED | OUTPATIENT
Start: 2025-07-14 | End: 2025-07-14 | Stop reason: HOSPADM

## 2025-07-14 RX ORDER — ROPIVACAINE HYDROCHLORIDE 5 MG/ML
INJECTION, SOLUTION EPIDURAL; INFILTRATION; PERINEURAL
Status: COMPLETED | OUTPATIENT
Start: 2025-07-14 | End: 2025-07-14

## 2025-07-14 RX ORDER — SODIUM CHLORIDE, SODIUM LACTATE, POTASSIUM CHLORIDE, CALCIUM CHLORIDE 600; 310; 30; 20 MG/100ML; MG/100ML; MG/100ML; MG/100ML
100 INJECTION, SOLUTION INTRAVENOUS CONTINUOUS
Status: DISCONTINUED | OUTPATIENT
Start: 2025-07-14 | End: 2025-07-14 | Stop reason: HOSPADM

## 2025-07-14 RX ORDER — PROPOFOL 10 MG/ML
INJECTION, EMULSION INTRAVENOUS AS NEEDED
Status: DISCONTINUED | OUTPATIENT
Start: 2025-07-14 | End: 2025-07-14

## 2025-07-14 RX ORDER — HYDROCODONE BITARTRATE AND ACETAMINOPHEN 5; 325 MG/1; MG/1
1 TABLET ORAL EVERY 4 HOURS PRN
Qty: 42 TABLET | Refills: 0 | Status: SHIPPED | OUTPATIENT
Start: 2025-07-14 | End: 2025-07-21

## 2025-07-14 RX ORDER — MIDAZOLAM HYDROCHLORIDE 1 MG/ML
1 INJECTION, SOLUTION INTRAMUSCULAR; INTRAVENOUS ONCE AS NEEDED
Status: DISCONTINUED | OUTPATIENT
Start: 2025-07-14 | End: 2025-07-14 | Stop reason: HOSPADM

## 2025-07-14 RX ORDER — SODIUM CHLORIDE, SODIUM LACTATE, POTASSIUM CHLORIDE, CALCIUM CHLORIDE 600; 310; 30; 20 MG/100ML; MG/100ML; MG/100ML; MG/100ML
INJECTION, SOLUTION INTRAVENOUS CONTINUOUS PRN
Status: DISCONTINUED | OUTPATIENT
Start: 2025-07-14 | End: 2025-07-14

## 2025-07-14 RX ORDER — DOCUSATE SODIUM 100 MG/1
100 CAPSULE, LIQUID FILLED ORAL 2 TIMES DAILY
Qty: 28 CAPSULE | Refills: 2 | Status: SHIPPED | OUTPATIENT
Start: 2025-07-14 | End: 2025-08-25

## 2025-07-14 RX ORDER — CEFAZOLIN SODIUM 2 G/100ML
2 INJECTION, SOLUTION INTRAVENOUS ONCE
Status: COMPLETED | OUTPATIENT
Start: 2025-07-14 | End: 2025-07-14

## 2025-07-14 RX ORDER — ONDANSETRON HYDROCHLORIDE 2 MG/ML
INJECTION, SOLUTION INTRAVENOUS AS NEEDED
Status: DISCONTINUED | OUTPATIENT
Start: 2025-07-14 | End: 2025-07-14

## 2025-07-14 RX ORDER — LIDOCAINE HYDROCHLORIDE 10 MG/ML
0.1 INJECTION, SOLUTION EPIDURAL; INFILTRATION; INTRACAUDAL; PERINEURAL ONCE
Status: DISCONTINUED | OUTPATIENT
Start: 2025-07-14 | End: 2025-07-14 | Stop reason: HOSPADM

## 2025-07-14 RX ORDER — FENTANYL CITRATE 50 UG/ML
50 INJECTION, SOLUTION INTRAMUSCULAR; INTRAVENOUS ONCE
Status: COMPLETED | OUTPATIENT
Start: 2025-07-14 | End: 2025-07-14

## 2025-07-14 RX ORDER — APREPITANT 40 MG/1
40 CAPSULE ORAL DAILY
Status: DISCONTINUED | OUTPATIENT
Start: 2025-07-14 | End: 2025-07-14 | Stop reason: HOSPADM

## 2025-07-14 RX ORDER — ONDANSETRON 4 MG/1
4 TABLET, FILM COATED ORAL EVERY 8 HOURS PRN
Qty: 20 TABLET | Refills: 0 | Status: SHIPPED | OUTPATIENT
Start: 2025-07-14

## 2025-07-14 RX ORDER — KETOROLAC TROMETHAMINE 30 MG/ML
INJECTION, SOLUTION INTRAMUSCULAR; INTRAVENOUS AS NEEDED
Status: DISCONTINUED | OUTPATIENT
Start: 2025-07-14 | End: 2025-07-14

## 2025-07-14 RX ORDER — FENTANYL CITRATE 50 UG/ML
50 INJECTION, SOLUTION INTRAMUSCULAR; INTRAVENOUS EVERY 5 MIN PRN
Status: DISCONTINUED | OUTPATIENT
Start: 2025-07-14 | End: 2025-07-14 | Stop reason: HOSPADM

## 2025-07-14 RX ORDER — LIDOCAINE HYDROCHLORIDE 10 MG/ML
INJECTION, SOLUTION EPIDURAL; INFILTRATION; INTRACAUDAL; PERINEURAL AS NEEDED
Status: DISCONTINUED | OUTPATIENT
Start: 2025-07-14 | End: 2025-07-14

## 2025-07-14 RX ORDER — ONDANSETRON HYDROCHLORIDE 2 MG/ML
4 INJECTION, SOLUTION INTRAVENOUS ONCE AS NEEDED
Status: DISCONTINUED | OUTPATIENT
Start: 2025-07-14 | End: 2025-07-14 | Stop reason: HOSPADM

## 2025-07-14 RX ORDER — MEPERIDINE HYDROCHLORIDE 25 MG/ML
12.5 INJECTION INTRAMUSCULAR; INTRAVENOUS; SUBCUTANEOUS EVERY 10 MIN PRN
Status: DISCONTINUED | OUTPATIENT
Start: 2025-07-14 | End: 2025-07-14 | Stop reason: HOSPADM

## 2025-07-14 RX ORDER — FENTANYL CITRATE 50 UG/ML
INJECTION, SOLUTION INTRAMUSCULAR; INTRAVENOUS AS NEEDED
Status: DISCONTINUED | OUTPATIENT
Start: 2025-07-14 | End: 2025-07-14

## 2025-07-14 RX ADMIN — LIDOCAINE HYDROCHLORIDE 5 ML: 10 INJECTION, SOLUTION EPIDURAL; INFILTRATION; INTRACAUDAL; PERINEURAL at 07:16

## 2025-07-14 RX ADMIN — FENTANYL CITRATE 50 MCG: 50 INJECTION INTRAMUSCULAR; INTRAVENOUS at 07:01

## 2025-07-14 RX ADMIN — ONDANSETRON 4 MG: 2 INJECTION, SOLUTION INTRAMUSCULAR; INTRAVENOUS at 07:38

## 2025-07-14 RX ADMIN — MIDAZOLAM 2 MG: 1 INJECTION INTRAMUSCULAR; INTRAVENOUS at 07:00

## 2025-07-14 RX ADMIN — FENTANYL CITRATE 50 MCG: 50 INJECTION, SOLUTION INTRAMUSCULAR; INTRAVENOUS at 08:31

## 2025-07-14 RX ADMIN — KETOROLAC TROMETHAMINE 30 MG: 30 INJECTION, SOLUTION INTRAMUSCULAR at 08:00

## 2025-07-14 RX ADMIN — ROPIVACAINE HYDROCHLORIDE 20 ML: 5 INJECTION, SOLUTION EPIDURAL; INFILTRATION; PERINEURAL at 07:03

## 2025-07-14 RX ADMIN — SODIUM CHLORIDE, POTASSIUM CHLORIDE, SODIUM LACTATE AND CALCIUM CHLORIDE: 600; 310; 30; 20 INJECTION, SOLUTION INTRAVENOUS at 07:12

## 2025-07-14 RX ADMIN — CEFAZOLIN SODIUM 2 G: 2 INJECTION, SOLUTION INTRAVENOUS at 07:22

## 2025-07-14 RX ADMIN — FENTANYL CITRATE 50 MCG: 50 INJECTION, SOLUTION INTRAMUSCULAR; INTRAVENOUS at 07:49

## 2025-07-14 RX ADMIN — PROPOFOL 200 MG: 10 INJECTION, EMULSION INTRAVENOUS at 07:16

## 2025-07-14 RX ADMIN — DEXAMETHASONE SODIUM PHOSPHATE 4 MG: 4 INJECTION, SOLUTION INTRAMUSCULAR; INTRAVENOUS at 07:03

## 2025-07-14 RX ADMIN — POVIDONE-IODINE 1 APPLICATION: 5 SOLUTION TOPICAL at 06:00

## 2025-07-14 SDOH — HEALTH STABILITY: MENTAL HEALTH: CURRENT SMOKER: 0

## 2025-07-14 ASSESSMENT — PAIN SCALES - GENERAL
PAINLEVEL_OUTOF10: 0 - NO PAIN
PAINLEVEL_OUTOF10: 3
PAINLEVEL_OUTOF10: 6
PAINLEVEL_OUTOF10: 0 - NO PAIN
PAIN_LEVEL: 2
PAINLEVEL_OUTOF10: 3
PAINLEVEL_OUTOF10: 0 - NO PAIN
PAINLEVEL_OUTOF10: 3
PAINLEVEL_OUTOF10: 0 - NO PAIN
PAINLEVEL_OUTOF10: 0 - NO PAIN

## 2025-07-14 ASSESSMENT — PAIN DESCRIPTION - DESCRIPTORS
DESCRIPTORS: SORE
DESCRIPTORS: SORE
DESCRIPTORS: ACHING
DESCRIPTORS: ACHING

## 2025-07-14 ASSESSMENT — COLUMBIA-SUICIDE SEVERITY RATING SCALE - C-SSRS
2. HAVE YOU ACTUALLY HAD ANY THOUGHTS OF KILLING YOURSELF?: NO
6. HAVE YOU EVER DONE ANYTHING, STARTED TO DO ANYTHING, OR PREPARED TO DO ANYTHING TO END YOUR LIFE?: NO
1. IN THE PAST MONTH, HAVE YOU WISHED YOU WERE DEAD OR WISHED YOU COULD GO TO SLEEP AND NOT WAKE UP?: NO

## 2025-07-14 ASSESSMENT — PAIN - FUNCTIONAL ASSESSMENT
PAIN_FUNCTIONAL_ASSESSMENT: WONG-BAKER FACES
PAIN_FUNCTIONAL_ASSESSMENT: WONG-BAKER FACES
PAIN_FUNCTIONAL_ASSESSMENT: 0-10
PAIN_FUNCTIONAL_ASSESSMENT: 0-10
PAIN_FUNCTIONAL_ASSESSMENT: WONG-BAKER FACES
PAIN_FUNCTIONAL_ASSESSMENT: 0-10

## 2025-07-14 NOTE — ANESTHESIA PROCEDURE NOTES
Airway  Date/Time: 7/14/2025 7:17 AM  Reason: elective    Airway not difficult    Staffing  Performed: CAA   Authorized by: Dominguez Mccoy MD    Performed by: RICHELLE Richter  Patient location during procedure: OR    Patient Condition  Indications for airway management: anesthesia  Patient position: sniffing  Sedation level: deep     Final Airway Details   Preoxygenated: yes  Final airway type: supraglottic airway  Successful airway: classic  Size: 4  Cuff Pressure (cm H2O): 10  Number of attempts at approach: 1    Additional Comments  Easy placement, no problems. (Ambu)

## 2025-07-14 NOTE — DISCHARGE INSTRUCTIONS
"At-Home Instructions - Dr. Mckeon    Date: 7/14/2025   Surgery: R foot hardware removal      Cast/Splint/Dressing Care Instructions  1) Keep cast/dressing clean and dry.  2) May bathe - but cast/dressing must remain dry. You may purchase a \"cast cover\" online or at most outpatient pharmacies like [facility] or [facility].   3) Should the cast become wet, you need to call your physician's clinic immediately for cast removal and replacement.  Moisture can cause skin breakdown and lead to infection if left untreated.  4) Do not stick any sharp object down the cast in order to itch, as this can cause scrapes/cuts/punctures which can lead to infection.  5) Observe for increasing pain in the extremity with the cast, finger/toe-tips turning blue/purple, or numbness and tingling in your toes/fingers.  Should any of these symptoms arise, you need to be seen immediately for evaluation of swelling and increasing compartment pressures within your affected extremity.  6) Keep your surgical extremity elevated - \"Toes Above your Nose\"  - This is key in the first two weeks after surgery to minimize swelling.  7) You may ice your extremity, being careful to prevent melting ice from saturating into the splint/cast. The best place to apply ice if you're in a splint is behind the knee.     Activity    You must remain non-weight bearing on your operative (Right leg) extremity.  Use crutches or a walker for ambulation.  No driving while on narcotic pain medication.    Do not get your dressing/cast/splint wet!    Discharge Pain Medications  You will be given a prescription for pain medication. You should start taking this the same day after your surgery.  Wean off as tolerated.  Do not wait to take the pain medication until the pain is severe, as it will be difficult to \"catch up\" once this occurs.      The pain medication usually reaches its full effect ~1 hour after ingesting.     If you have been sent home on Colace, this medication " should be taken until you are off all narcotic (i.e. Vicodin, Percocet, Oxycodone, etc) pain medications, in order to prevent constipation.  Senna and MiraLax are other over-the-counter medications that you can take to help with post-operative constipation which is usually related to the prescription pain medication. Make sure you drink plenty of water and eat a healthy diet.     Percocet or Norco have Tylenol in their ingredient lists.  You must be careful not to exceed 3,000mg (3 grams) of Tylenol, from all sources, within a single 24-hr period.      Some common side effects of the narcotic pain medications (Percocet, Oxycodone, Norco, etc) include nausea and itching.  Benadryl is a great over the counter medication that helps calm your stomach, decreases your anxiety levels, and minimizes the itching.  You can easily purchase this at your local pharmacy as an over-the-counter medication.  Please abide by the instructions as printed on the bottle.  If your nausea persists, make sure to take small amounts of crackers or other lighter foods.    Follow-Up/Emergency Contacts  Follow-up with Dr. Danielle Mckeon's office, as scheduled.    Please call [phone] for an appointment if one has not been scheduled. Follow up 1 week after surgery.    If you have any concerns, please call [phone].    Blood Clot Prophylaxis  You will need to take ASA (Aspirin) 325 mg twice daily for 4 weeks after surgery. You will be provided a prescription, but can also purchase this over the counter.

## 2025-07-14 NOTE — OP NOTE
Operative Note     Date: 2025  OR Location: [facility]    Name: Glenda Koch, : 1965, Age: 60 y.o., MRN: [id number], Sex: female    Diagnosis  Pre-op Diagnosis      * Foot and ankle pain [M79.673, M25.579] Post-op Diagnosis     * Foot and ankle pain [M79.673, M25.579]     Procedures  REMOVAL, HARDWARE, LOWER EXTREMITY (Hardware removal - TBD;)  37063 - AR REMOVAL IMPLANT DEEP    REMOVAL, HARDWARE, LOWER EXTREMITY (Hardware removal - TBD;)  63959 - AR SESAMOIDECTOMY FIRST TOE SPX      Surgeons      * Danielle Mckeon - Primary    Resident/Fellow/Other Assistant:  Surgeons and Role:  * No surgeons found with a matching role *    Staff:   Circulator: Dori Collins Person: Gabi Collins Person: Suad    Anesthesia Staff: Anesthesiologist: Dominguez Mccoy MD  C-AA: RICHELLE Richter    Procedure Summary  Anesthesia: General  ASA: II  Estimated Blood Loss: 5 mL  Intra-op Medications:   Administrations occurring from 0650 to 0830 on 25:   Medication Name Total Dose   dexAMETHasone (Decadron) 4 mg/mL IV Syringe 2 mL 4 mg   fentaNYL (Sublimaze) injection 50 mcg/mL 50 mcg   ketorolac (Toradol) injection 30 mg 30 mg   LR infusion Cannot be calculated   lidocaine PF (Xylocaine-MPF) local injection 1 % 5 mL   ondansetron (Zofran) 2 mg/mL injection 4 mg   propofol (Diprivan) injection 10 mg/mL 200 mg   ceFAZolin (Ancef) 2 g in dextrose (iso)  mL 2 g   dexAMETHasone (Decadron) 4 mg/mL IV Syringe 2 mL 4 mg   fentaNYL PF (Sublimaze) injection 50 mcg 50 mcg   midazolam (Versed) injection 2 mg 2 mg   ropivacaine (Naropin) injection 0.5 % 20 mL              Anesthesia Record               Intraprocedure I/O Totals          Intake    LR infusion 600.00 mL    ceFAZolin (Ancef) 2 g in dextrose (iso)  mL 100.00 mL    Total Intake 700 mL          Specimen: No specimens collected              Drains and/or Catheters: * None in log *    Tourniquet Times:     Total Tourniquet Time Documented:  Thigh (Right) -  46 minutes  Total: Thigh (Right) - 46 minutes      Implants:     Findings: Implanted staple and 1st MTP plate.     Indications: Glenda Koch is an 60 y.o. female who is having surgery for Foot and ankle pain [M79.673, M25.579].     The patient was seen in the preoperative area. The risks, benefits, complications, treatment options, non-operative alternatives, expected recovery and outcomes were discussed with the patient. The possibilities of reaction to medication, pulmonary aspiration, injury to surrounding structures, bleeding, recurrent infection, the need for additional procedures, failure to diagnose a condition, and creating a complication requiring transfusion or operation were discussed with the patient. The patient concurred with the proposed plan, giving informed consent.  The site of surgery was properly noted/marked if necessary per policy. The patient has been actively warmed in preoperative area. Preoperative antibiotics have been ordered and given within 1 hours of incision. Venous thrombosis prophylaxis have been ordered including unilateral sequential compression device    Procedure Details:     Indications:  This is a 60 y.o. female  who presented to clinic for evaluation of chronic left foot pain.  She had previously undergone a flatfoot reconstruction and first MTP fusion with another surgeon.  She presented with persistent pain over the lateral staple, as well as plantar and dorsal first MTP pain.  We discussed the option of hardware removal of the lateral staple and first MTP plate, in addition to a medial sesamoidectomy given her plantar pain.  After thorough discussion she elected to proceed.   We discussed the risks and benefits of surgery, including but not limited to, bleeding, infection, damage to nerves and vessels, fracture, persistent symptoms, incomplete relief of symptoms, DVT, need for further surgery, and the risks of anesthesia.  They understood all the risks, all other  questions were answered, and consent was obtained.    Details of Operative Procedure:    The patient was met in the preanesthesia holding area. Their correct Right leg was identified and marked. They  had a popliteal block performed by the anesthesiologist.  They were then brought to the operating room, and transferred to the operating table.  Gen. anesthesia was then induced.  Patient then had a well-padded thigh tourniquet placed on the operative leg.  A small bump was placed under the ipsilateral hip.  The operative leg was then thoroughly cleansed with a chlorhexidine sponge and soap.  The leg was then prepped and draped in the normal sterile fashion.  Perioperative antibiotics were given.  A timeout was then completed confirming the correct patient, operative site, participants, equipment, antibiotics.  The operative extremity was then exsanguinated with an Esmarch and the tourniquet elevated to 250 mmHg.    Using the prior incision first MTP, the skin and soft tissue were sharply incised.  The plate was identified and removed without difficulty.  Dissection was then taken to the plantar aspect of the proximal phalanx and first metatarsal.  The medial sesamoid was identified, and it was removed with a combination of osteotomes and rongeur.  Following removal, there was no further prominence of the plantar aspect of her foot.    Attention was then turned to the lateral aspect of the foot.  Using the prior lateral incision, the skin and soft tissue were sharply incised.  The staple was identified, and it was removed using an osteotome.    Fluoroscopy was used to confirm removal of the hardware, as well as removal of the medial sesamoid.    The tourniquet was then released.     The wounds were copiously irrigated. The subcutaneous tissue closed with 3-0 monocryl, and skin incisions were closed with 3-0 nylon. The wound was dressed with adaptic, gauze, ABD, and webril. They were placed into a well-padded short-leg  splint.     Evidence of Infection: No   Complications:  None; patient tolerated the procedure well.    Disposition: PACU - hemodynamically stable.  Condition: stable     Additional Details:   Post-op Protocol  They will be Non Weight Bearing for 3 weeks.   Week 1 post-op: Splint off, NWB XRs, incision check, placement into short leg cast  Week 3 post-op: Cast off, sutures out, placement into a short pneumatic boot, okay to progress to full weightbearing  Week 6 post op: Incision check.      Attending Attestation: I was present and scrubbed for the entire procedure.    Danielle Mckeon  Phone Number: [phone]

## 2025-07-14 NOTE — ANESTHESIA PREPROCEDURE EVALUATION
Patient: Glenda Koch    Procedure Information       Date/Time: 07/14/25 0650    Procedure: REMOVAL, HARDWARE, LOWER EXTREMITY (Hardware removal - TBD;) (Right: Foot)    Location: [facility] / [facility]    Surgeons: Danielle Mckeon MD            Relevant Problems   No relevant active problems       Clinical information reviewed:   Tobacco  Allergies  Meds   Med Hx  Surg Hx  OB Status  Fam Hx  Soc   Hx        NPO Detail:  NPO/Void Status  Date of Last Liquid: 07/13/25  Time of Last Liquid: 2300  Date of Last Solid: 07/13/25  Time of Last Solid: 2300  Time of Last Void: 0549         Physical Exam    Airway  Mallampati: II  TM distance: >3 FB  Neck ROM: full  Mouth opening: 3 or more finger widths     Cardiovascular - normal exam   Dental - normal exam     Pulmonary - normal exam   Abdominal - normal exam           Anesthesia Plan    History of general anesthesia?: yes  History of complications of general anesthesia?: no    ASA 2     general     The patient is not a current smoker.  Patient was not previously instructed to abstain from smoking on day of procedure.  Patient did not smoke on day of procedure.  Education provided regarding risk of obstructive sleep apnea.  intravenous induction   Postoperative pain plan includes opioids.  Trial extubation is planned.  Anesthetic plan and risks discussed with patient.  Use of blood products discussed with patient who consented to blood products.    Plan discussed with CAA, attending and CRNA.

## 2025-07-14 NOTE — NURSING NOTE
0833: Patient arrived in PACU drowsy resting comfortably able to respond to commands. Patient Rt. Foot elevated off bed and ice pack applied.   0845: Patients O2 discontinued.   0854: Patient awake and alert sitting up in bed, oral intake provided. Patient offers no complaints.  0907: Patient stable to be discharged from PACU to Phase 2. Patient sitting up in bed talking tolerating oral intake. Patient has no complaints of nausea and no episodes of vomiting. Patient states she has a 3/10 pain that is tolerable. Patient offers no complaints and all patients questions answered.

## 2025-07-14 NOTE — PERIOPERATIVE NURSING NOTE
Patient in Phase 2; dressed and up to chair with RN assist. Tolerating po fluids, no complaint of pain and no complaint of nausea.     Significant other at bedside; discussed discharge instructions with patient and Significant other. All questions at this time answered.     Discharge instructions provided using teachback method.  Patient's health-related risk factors discussed with patient.  Patient educated to look for worsening signs and symptoms and educated to seek medical attention if experiencing medical emergency.  Patient aware of needs to follow up with outpatient clinics as scheduled. Home going meds reviewed with patient.  Patient verbalized understanding of disposition and discharge instructions.  All questions answered to patient's satisfaction and within nursing scope of practice.    Patient clinically appropriate for discharge. Vitals stable/baseline.IV removed and patient transported to discharge area via wheelchair.

## 2025-07-14 NOTE — ANESTHESIA PROCEDURE NOTES
Peripheral Block    Patient location during procedure: pre-op  Medication administered at: 7/14/2025 7:03 AM  End time: 7/14/2025 7:05 AM  Reason for block: at surgeon's request and post-op pain management  Staffing  Performed: attending   Authorized by: Dominguez Mccoy MD    Performed by: Zohaib Price DO  Preanesthetic Checklist  Completed: patient identified, IV checked, site marked, risks and benefits discussed, surgical consent, monitors and equipment checked, pre-op evaluation and timeout performed   Timeout performed at: 7/14/2025 6:58 AM  Peripheral Block  Patient position: laying flat  Prep: ChloraPrep  Patient monitoring: heart rate, cardiac monitor and continuous pulse ox  Block type: popliteal  Laterality: right  Injection technique: single-shot  Guidance: ultrasound guided  Infiltration strength: 0.5 %  Dose: 20 mL  Needle  Needle gauge: 22 G  Needle length: 10 cm  Needle localization: ultrasound guidance  Assessment  Injection assessment: negative aspiration for heme, no paresthesia on injection, incremental injection and local visualized surrounding nerve on ultrasound  Paresthesia pain: none  Heart rate change: no  Slow fractionated injection: no  Medications Administered  dexAMETHasone (Decadron) injection - perineural injection   4 mg - 7/14/2025 7:03:00 AM  ropivacaine (NAROPIN) 5 MG/ML Perineural - perineural injection   20 mL - 7/14/2025 7:03:00 AM

## 2025-07-14 NOTE — BRIEF OP NOTE
Date: 2025  OR Location: [facility]    Name: Glenda Koch, : 1965, Age: 60 y.o., MRN: [id number], Sex: female    Diagnosis  Pre-op Diagnosis      * Foot and ankle pain [M79.673, M25.579] Post-op Diagnosis     * Foot and ankle pain [M79.673, M25.579]     Procedures  REMOVAL, HARDWARE, LOWER EXTREMITY (Hardware removal - TBD;)  49497 - MS REMOVAL IMPLANT DEEP    REMOVAL, HARDWARE, LOWER EXTREMITY (Hardware removal - TBD;)  60704 - MS SESAMOIDECTOMY FIRST TOE SPX      Surgeons      * Danielle Mckeon - Primary    Resident/Fellow/Other Assistant:  Surgeons and Role:  * No surgeons found with a matching role *    Staff:   Circulator: Dori Collins Person: Gabi Collins Person: Suad    Anesthesia Staff: Anesthesiologist: Dominguez Mccoy MD  C-AA: RICHELLE Richter    Procedure Summary  Anesthesia: General  ASA: II  Estimated Blood Loss: 0mL  Intra-op Medications:   Administrations occurring from 0650 to 0830 on 25:   Medication Name Total Dose   dexAMETHasone (Decadron) 4 mg/mL IV Syringe 2 mL 4 mg   fentaNYL (Sublimaze) injection 50 mcg/mL 50 mcg   ketorolac (Toradol) injection 30 mg 30 mg   LR infusion Cannot be calculated   lidocaine PF (Xylocaine-MPF) local injection 1 % 5 mL   ondansetron (Zofran) 2 mg/mL injection 4 mg   propofol (Diprivan) injection 10 mg/mL 200 mg   ceFAZolin (Ancef) 2 g in dextrose (iso)  mL 2 g   dexAMETHasone (Decadron) 4 mg/mL IV Syringe 2 mL 4 mg   fentaNYL PF (Sublimaze) injection 50 mcg 50 mcg   midazolam (Versed) injection 2 mg 2 mg   ropivacaine (Naropin) injection 0.5 % 20 mL              Anesthesia Record               Intraprocedure I/O Totals          Intake    LR infusion 600.00 mL    ceFAZolin (Ancef) 2 g in dextrose (iso)  mL 100.00 mL    Total Intake 700 mL          Specimen: No specimens collected       Findings: Consistent with preop diagnosis    Complications:  None; patient tolerated the procedure well.     Disposition: PACU - hemodynamically  stable.  Condition: stable  Specimens Collected: No specimens collected  Attending Attestation: I was present and scrubbed for the entire procedure.    Danielle Louie-[facility]  Phone Number: [phone]

## 2025-07-21 ENCOUNTER — HOSPITAL ENCOUNTER (OUTPATIENT)
Dept: RADIOLOGY | Facility: CLINIC | Age: 60
Discharge: HOME | End: 2025-07-21
Payer: COMMERCIAL

## 2025-07-21 ENCOUNTER — OFFICE VISIT (OUTPATIENT)
Dept: ORTHOPEDIC SURGERY | Facility: CLINIC | Age: 60
End: 2025-07-21
Payer: COMMERCIAL

## 2025-07-21 DIAGNOSIS — M79.673 FOOT AND ANKLE PAIN: ICD-10-CM

## 2025-07-21 DIAGNOSIS — M25.579 FOOT AND ANKLE PAIN: ICD-10-CM

## 2025-07-21 PROCEDURE — 73630 X-RAY EXAM OF FOOT: CPT | Mod: RIGHT SIDE | Performed by: RADIOLOGY

## 2025-07-21 PROCEDURE — 99214 OFFICE O/P EST MOD 30 MIN: CPT | Performed by: ORTHOPAEDIC SURGERY

## 2025-07-21 PROCEDURE — 99024 POSTOP FOLLOW-UP VISIT: CPT | Performed by: ORTHOPAEDIC SURGERY

## 2025-07-21 PROCEDURE — 73630 X-RAY EXAM OF FOOT: CPT | Mod: RT

## 2025-07-21 RX ORDER — HYDROCODONE BITARTRATE AND ACETAMINOPHEN 5; 325 MG/1; MG/1
1 TABLET ORAL EVERY 12 HOURS PRN
Qty: 14 TABLET | Refills: 0 | Status: SHIPPED | OUTPATIENT
Start: 2025-07-21 | End: 2025-07-28

## 2025-07-21 ASSESSMENT — PAIN - FUNCTIONAL ASSESSMENT: PAIN_FUNCTIONAL_ASSESSMENT: NO/DENIES PAIN

## 2025-07-21 NOTE — PROGRESS NOTES
Glenda Koch     Surgery Date: 7/14/2025  Surgery: Left foot hardware removal, medial sesamoidectomy    Interval History:  They are now 1 week(s) since surgery. They report minimal pain.  She has been nonweightbearing in her splint.  She denies any chest pains or shortness of breath.  No concerns today.    ASSESSMENT/PLAN:  Cast today  Continue nonweightbearing  Norco refilled      Follow up in 2 weeks      Physical Exam:  Well appearing female in no acute distress; Alert and oriented.  Left  Leg:  Incision is clean dry and intact.  There is no erythema warmth or drainage.  They have palpable pulses, sensation is intact.  They are able to flex and extend their toes and ankles without difficulty    Radiographs:  Imaging was ordered today. Final results and radiologist's interpretation, available in the [facility] health record. Images were reviewed with the patient/family members in the office today. My personal interpretation of the performed imaging is interval removal of the calcaneal and first MTP hardware.  Medial sesamoidectomy    Medication, History, and Allergies were reviewed and updated in the medical record.    Danielle Mckeon MD

## 2025-08-06 ENCOUNTER — OFFICE VISIT (OUTPATIENT)
Dept: ORTHOPEDIC SURGERY | Facility: CLINIC | Age: 60
End: 2025-08-06
Payer: COMMERCIAL

## 2025-08-06 ENCOUNTER — HOSPITAL ENCOUNTER (OUTPATIENT)
Dept: RADIOLOGY | Facility: CLINIC | Age: 60
Discharge: HOME | End: 2025-08-06
Payer: COMMERCIAL

## 2025-08-06 DIAGNOSIS — M79.672 LEFT FOOT PAIN: ICD-10-CM

## 2025-08-06 PROCEDURE — 73630 X-RAY EXAM OF FOOT: CPT | Mod: RIGHT SIDE | Performed by: RADIOLOGY

## 2025-08-06 PROCEDURE — 99024 POSTOP FOLLOW-UP VISIT: CPT | Performed by: ORTHOPAEDIC SURGERY

## 2025-08-06 PROCEDURE — 73630 X-RAY EXAM OF FOOT: CPT | Mod: RT

## 2025-08-06 PROCEDURE — 99214 OFFICE O/P EST MOD 30 MIN: CPT | Performed by: ORTHOPAEDIC SURGERY

## 2025-08-06 ASSESSMENT — PAIN - FUNCTIONAL ASSESSMENT: PAIN_FUNCTIONAL_ASSESSMENT: 0-10

## 2025-08-06 ASSESSMENT — PAIN DESCRIPTION - DESCRIPTORS: DESCRIPTORS: ACHING

## 2025-08-06 ASSESSMENT — PAIN SCALES - GENERAL: PAINLEVEL_OUTOF10: 3

## 2025-08-06 NOTE — PROGRESS NOTES
Glenda Koch       Surgery Date: 7/14/2025  Surgery: Left foot hardware removal, medial sesamoidectomy    Interval History:  They are now 3 week(s) since surgery. They report minimal pain.  No concerns today.    ASSESSMENT/PLAN:  Sutures out today  Placed in a boot, weightbearing as tolerated      Follow up in 3 weeks.      Physical Exam:  Well appearing female in no acute distress; Alert and oriented.  Left  Leg:  Incision is clean dry and intact.  There is no erythema warmth or drainage.  They have palpable pulses, sensation is intact.  They are able to flex and extend their toes and ankles without difficulty      Radiographs:  Imaging was ordered today. Final results and radiologist's interpretation, available in the [facility] health record. Images were reviewed with the patient/family members in the office today. My personal interpretation of the performed imaging is interval hardware removal, sesamoidectomy    Medication, History, and Allergies were reviewed and updated in the medical record.    Danielle Mckeon MD

## 2025-08-18 ENCOUNTER — HOSPITAL ENCOUNTER (OUTPATIENT)
Dept: NON INVASIVE DIAGNOSTICS | Age: 60
Discharge: HOME | End: 2025-08-18
Payer: COMMERCIAL

## 2025-08-18 ENCOUNTER — HOSPITAL ENCOUNTER (OUTPATIENT)
Dept: LAB | Age: 60
Discharge: HOME | End: 2025-08-18
Payer: COMMERCIAL

## 2025-08-18 LAB
EKG ATRIAL RATE: 74 BPM
EKG P AXIS: 12 DEGREES
EKG P-R INTERVAL: 152 MS
EKG Q-T INTERVAL: 380 MS
EKG QRS DURATION: 88 MS
EKG QTC CALCULATION (BAZETT): 421 MS
EKG R AXIS: -22 DEGREES
EKG T AXIS: 23 DEGREES
EKG VENTRICULAR RATE: 74 BPM
TROPONIN I SERPL HS-MCNC: <6 NG/L (ref 0–14)

## 2025-08-18 PROCEDURE — 36415 COLL VENOUS BLD VENIPUNCTURE: CPT

## 2025-08-18 PROCEDURE — 93005 ELECTROCARDIOGRAM TRACING: CPT | Performed by: FAMILY MEDICINE

## 2025-08-18 PROCEDURE — 84484 ASSAY OF TROPONIN QUANT: CPT

## 2025-08-27 ENCOUNTER — OFFICE VISIT (OUTPATIENT)
Dept: ORTHOPEDIC SURGERY | Facility: CLINIC | Age: 60
End: 2025-08-27
Payer: COMMERCIAL

## 2025-08-27 DIAGNOSIS — M25.579 FOOT AND ANKLE PAIN: Primary | ICD-10-CM

## 2025-08-27 DIAGNOSIS — M79.673 FOOT AND ANKLE PAIN: Primary | ICD-10-CM

## 2025-08-27 PROCEDURE — 99212 OFFICE O/P EST SF 10 MIN: CPT

## 2025-08-27 PROCEDURE — 1036F TOBACCO NON-USER: CPT | Performed by: ORTHOPAEDIC SURGERY

## 2025-08-27 PROCEDURE — 99024 POSTOP FOLLOW-UP VISIT: CPT | Performed by: ORTHOPAEDIC SURGERY

## 2025-08-27 ASSESSMENT — PAIN DESCRIPTION - DESCRIPTORS: DESCRIPTORS: BURNING

## 2025-08-27 ASSESSMENT — PAIN - FUNCTIONAL ASSESSMENT: PAIN_FUNCTIONAL_ASSESSMENT: 0-10

## 2025-08-27 ASSESSMENT — PAIN SCALES - GENERAL: PAINLEVEL_OUTOF10: 1

## (undated) DEVICE — ANTI-FOG SOLUTION WITH FOAM PAD: Brand: DEVON

## (undated) DEVICE — GLOVE, SURGICAL, SYNTHETIC, DERMAPRENE, 7, PF, LF, GREEN

## (undated) DEVICE — BANDAGE, ESMARK, 6 IN X 12 FT

## (undated) DEVICE — PADDING, UNDERCAST, WEBRIL, 4 IN X 4 YD, REG, NS

## (undated) DEVICE — BLADELESS OBTURATOR: Brand: WECK VISTA

## (undated) DEVICE — DRAPE, SHEET, U, W/ADHESIVE STRIP, IMPERVIOUS, 60 X 70 IN, DISPOSABLE, LF, STERILE

## (undated) DEVICE — MICRO TIP WIPE: Brand: DEVON

## (undated) DEVICE — PATIENT RETURN ELECTRODE, SINGLE-USE, CONTACT QUALITY MONITORING, ADULT, WITH 9FT CORD, FOR PATIENTS WEIGING OVER 33LBS. (15KG): Brand: MEGADYNE

## (undated) DEVICE — GLOVE, PROTEXIS PI CLASSIC, SZ-6.5, PF, LF

## (undated) DEVICE — SCOPE DAVINCI XI 0 DEG W/CORD

## (undated) DEVICE — SET MAJOR INSTR HOUSE

## (undated) DEVICE — PADDING, UNDERCAST, WEBRIL, 6 IN X 4 YD, REG, NS

## (undated) DEVICE — SUTURE V-LOC 180 SZ 0 L9IN ABSRB GRN GS-21 L37MM 1/2 CIR VLOCL0346

## (undated) DEVICE — CAMERA STRYKER 1488 HD GEN

## (undated) DEVICE — LAPAROSCOPIC SCISSORS: Brand: EPIX LAPAROSCOPIC SCISSORS

## (undated) DEVICE — VESSEL SEALER: Brand: ENDOWRIST

## (undated) DEVICE — CYSTO/BLADDER IRRIGATION SET, REGULATING CLAMP

## (undated) DEVICE — DRAPE COVER, C ARM, FLOUROSCAN IMAGING SYS

## (undated) DEVICE — KIT SURG PREP POVIDONE IOD WET FOR UNIV USE SPNG STK

## (undated) DEVICE — CUFF, TOURNIQUET, 30 X 4, DUAL PORT/SNGL BLADDER, DISP, LF

## (undated) DEVICE — CAUTERY, PENCIL, PUSH BUTTON, SMOKE EVAC, 70MM

## (undated) DEVICE — INTENDED FOR TISSUE SEPARATION, AND OTHER PROCEDURES THAT REQUIRE A SHARP SURGICAL BLADE TO PUNCTURE OR CUT.: Brand: BARD-PARKER ® STAINLESS STEEL BLADES

## (undated) DEVICE — CANNULA SEAL

## (undated) DEVICE — GLOVE ORANGE PI 7 1/2   MSG9075

## (undated) DEVICE — PROGRASP FORCEPS: Brand: ENDOWRIST

## (undated) DEVICE — WARMER SCP LAP

## (undated) DEVICE — DRAPE KIT, MINI C-ARM

## (undated) DEVICE — Device

## (undated) DEVICE — STOCKINETTE, IMPERVIOUS, 12 X 48 IN, LF, STERILE

## (undated) DEVICE — SYRINGE, LUER LOCK, 10ML: Brand: MEDLINE

## (undated) DEVICE — Z INACTIVE USE 2660664 SOLUTION IRRIG 3000ML 0.9% SOD CHL USP UROMATIC PLAS CONT

## (undated) DEVICE — SYSTEM ES CUP DIA3.5CM PNEUMO OCCL BLLN DISP FOR CLIN POS

## (undated) DEVICE — SUTURE, ETHILON, 3-0, 18 IN, PS1, BLACK

## (undated) DEVICE — GARMENT,MEDLINE,DVT,INT,CALF,MED, GEN2: Brand: MEDLINE

## (undated) DEVICE — EXTRAS MEHTA

## (undated) DEVICE — DOUBLE BASIN SET: Brand: MEDLINE INDUSTRIES, INC.

## (undated) DEVICE — PLUG,CATHETER,DRAINAGE PROTECTOR,TUBE: Brand: MEDLINE

## (undated) DEVICE — PLUMEPORT LAPAROSCOPIC SMOKE FILTRATION DEVICE: Brand: PLUMEPORT ACTIV

## (undated) DEVICE — SYRINGE,TOOMEY,IRRIGATION,70CC,STERILE: Brand: MEDLINE

## (undated) DEVICE — COVER, C-ARM W/CLIPS, OEC GE

## (undated) DEVICE — [HIGH FLOW INSUFFLATOR,  DO NOT USE IF PACKAGE IS DAMAGED,  KEEP DRY,  KEEP AWAY FROM SUNLIGHT,  PROTECT FROM HEAT AND RADIOACTIVE SOURCES.]: Brand: PNEUMOSURE

## (undated) DEVICE — MARKER,SKIN,WI/RULER AND LABELS: Brand: MEDLINE

## (undated) DEVICE — COVER,TABLE,60X90,STERILE: Brand: MEDLINE

## (undated) DEVICE — TOWEL,OR,DSP,ST,BLUE,STD,6/PK,12PK/CS: Brand: MEDLINE

## (undated) DEVICE — SOLUTION IV 1000ML 0.9% SOD CHL PH 5 INJ USP VIAFLX PLAS

## (undated) DEVICE — DRESSING, NON-ADHERENT, OIL EMULSION, CURITY, 3 X 8 IN, STERILE

## (undated) DEVICE — PUMP SUC IRR TBNG L10FT W/ HNDPC ASSEMB STRYKEFLOW 2

## (undated) DEVICE — ELECTRO LUBE IS A SINGLE PATIENT USE DEVICE THAT IS INTENDED TO BE USED ON ELECTROSURGICAL ELECTRODES TO REDUCE STICKING.: Brand: KEY SURGICAL ELECTRO LUBE

## (undated) DEVICE — SET INST DAVINCI ACCESSORIES

## (undated) DEVICE — DRESSING, GAUZE, SPONGE, KERLIX, SUPER, 6 X 6.75 IN, STERILE 10PK

## (undated) DEVICE — NDL CNTR 40CT FM MAG: Brand: MEDLINE INDUSTRIES, INC.

## (undated) DEVICE — TROCAR ENDOSCP L100MM DIA12MM DIL TIP STBL SL ENDOPATH XCEL

## (undated) DEVICE — NEEDLE INSUF L120MM DIA2MM DISP FOR PNEUMOPERI ENDOPATH

## (undated) DEVICE — TIP IU L3.75CM DIA5.1MM YEL SFT FLX DST END DISP RUMI II

## (undated) DEVICE — SOLUTION, IRRIGATION, X RX SODIUM CHL 0.9%, 1000ML BTL

## (undated) DEVICE — DRESSING, ABDOMINAL, TENDERSORB, 8 X 10 IN, STERILE

## (undated) DEVICE — TRAY,VAG PREP,2PR VNYL GLV,4 C: Brand: MEDLINE INDUSTRIES, INC.

## (undated) DEVICE — SYRINGE IRRIG 60ML SFT PLIABLE BLB EZ TO GRP 1 HND USE W/

## (undated) DEVICE — SCISSORS SURG DIA8MM MPLR CRV ENDOWRIST

## (undated) DEVICE — 40586 ADVANCED TRENDELENBURG POSITIONING KIT: Brand: 40586 ADVANCED TRENDELENBURG POSITIONING KIT

## (undated) DEVICE — GOWN,SIRUS,NONRNF,SETINSLV,XL,20/CS: Brand: MEDLINE

## (undated) DEVICE — GAUZE,SPONGE,4"X4",16PLY,XRAY,STRL,LF: Brand: MEDLINE

## (undated) DEVICE — ARM DRAPE

## (undated) DEVICE — CIRCON 21F CYSTO TRAY

## (undated) DEVICE — SHEET DRAPE FULL 70X100

## (undated) DEVICE — PACK,AURORA,LAVH: Brand: MEDLINE

## (undated) DEVICE — DILATOR SURG CNL DISP STRL MILEX

## (undated) DEVICE — SET SURG INSTR DISSECT

## (undated) DEVICE — SYRINGE MED 50ML LUERLOCK TIP

## (undated) DEVICE — PAD MATERNITY CURITY ADH STRIP DISP

## (undated) DEVICE — TIP COVER ACCESSORY

## (undated) DEVICE — TRAY PROCED DILATATION CURETTAGE

## (undated) DEVICE — CIRCON 30/70 URO LENS

## (undated) DEVICE — GAUZE SPONGES,8 PLY: Brand: CURITY

## (undated) DEVICE — APPLICATOR, CHLORAPREP, W/ORANGE TINT, 26ML

## (undated) DEVICE — COLUMN DRAPE

## (undated) DEVICE — TIP IU L6CM DIA5.1MM LAV SIL SFT FLX DST END DISP RUMI II

## (undated) DEVICE — BANDAGE, COFLEX, 6 X 5 YDS, TAN, STERILE, LF

## (undated) DEVICE — CATHETER URETH 18FR BLLN 5CC SIL HYDRGEL 3 W F INDWL INF